# Patient Record
Sex: FEMALE | Race: OTHER | Employment: UNEMPLOYED | URBAN - METROPOLITAN AREA
[De-identification: names, ages, dates, MRNs, and addresses within clinical notes are randomized per-mention and may not be internally consistent; named-entity substitution may affect disease eponyms.]

---

## 2021-11-27 ENCOUNTER — HOSPITAL ENCOUNTER (EMERGENCY)
Age: 26
Discharge: HOME | End: 2021-11-27
Attending: EMERGENCY MEDICINE

## 2021-11-27 VITALS
HEIGHT: 59 IN | TEMPERATURE: 97.7 F | SYSTOLIC BLOOD PRESSURE: 130 MMHG | WEIGHT: 91.71 LBS | DIASTOLIC BLOOD PRESSURE: 64 MMHG | OXYGEN SATURATION: 98 % | HEART RATE: 94 BPM | RESPIRATION RATE: 18 BRPM | BODY MASS INDEX: 18.49 KG/M2

## 2021-11-27 DIAGNOSIS — K80.20 CALCULUS OF GALLBLADDER WITHOUT CHOLECYSTITIS WITHOUT OBSTRUCTION: ICD-10-CM

## 2021-11-27 DIAGNOSIS — K21.9 GASTROESOPHAGEAL REFLUX DISEASE, UNSPECIFIED WHETHER ESOPHAGITIS PRESENT: Primary | ICD-10-CM

## 2021-11-27 PROCEDURE — 74011000250 HC RX REV CODE- 250: Performed by: EMERGENCY MEDICINE

## 2021-11-27 PROCEDURE — 99283 EMERGENCY DEPT VISIT LOW MDM: CPT

## 2021-11-27 PROCEDURE — 74011250637 HC RX REV CODE- 250/637: Performed by: EMERGENCY MEDICINE

## 2021-11-27 RX ORDER — PANTOPRAZOLE SODIUM 40 MG/1
40 TABLET, DELAYED RELEASE ORAL DAILY
Qty: 20 TABLET | Refills: 0 | Status: SHIPPED | OUTPATIENT
Start: 2021-11-27 | End: 2021-12-17

## 2021-11-27 RX ADMIN — ALUMINUM HYDROXIDE, MAGNESIUM HYDROXIDE, AND SIMETHICONE 40 ML: 200; 200; 20 SUSPENSION ORAL at 18:13

## 2021-11-27 NOTE — ED TRIAGE NOTES
Patient arrives with c/o of abdominal pain, and bloating. States pain is worse after eating. States pain in epigastric area, and described as burning sensation. Reports intermittent nausea. States sx present x 1 year.

## 2021-11-28 NOTE — DISCHARGE INSTRUCTIONS
A gallstone was seen on your bedside ultrasound today. Please follow up with general surgery for further work up. Take protonix for pain for your heartburn. Thank you.

## 2021-11-29 NOTE — ED PROVIDER NOTES
Pt is a 26 yo previously healthy female with epigastric pain after meal time. Notes almost daily episodes, no current pain. No N/V. No urinary or vaginal symptoms. Denies CP/SOB/palpitations. No past medical history on file. No past surgical history on file. No family history on file. Social History     Socioeconomic History    Marital status: SINGLE     Spouse name: Not on file    Number of children: Not on file    Years of education: Not on file    Highest education level: Not on file   Occupational History    Not on file   Tobacco Use    Smoking status: Not on file    Smokeless tobacco: Not on file   Substance and Sexual Activity    Alcohol use: Not on file    Drug use: Not on file    Sexual activity: Not on file   Other Topics Concern    Not on file   Social History Narrative    Not on file     Social Determinants of Health     Financial Resource Strain:     Difficulty of Paying Living Expenses: Not on file   Food Insecurity:     Worried About Running Out of Food in the Last Year: Not on file    Clark of Food in the Last Year: Not on file   Transportation Needs:     Lack of Transportation (Medical): Not on file    Lack of Transportation (Non-Medical):  Not on file   Physical Activity:     Days of Exercise per Week: Not on file    Minutes of Exercise per Session: Not on file   Stress:     Feeling of Stress : Not on file   Social Connections:     Frequency of Communication with Friends and Family: Not on file    Frequency of Social Gatherings with Friends and Family: Not on file    Attends Yarsanism Services: Not on file    Active Member of Clubs or Organizations: Not on file    Attends Club or Organization Meetings: Not on file    Marital Status: Not on file   Intimate Partner Violence:     Fear of Current or Ex-Partner: Not on file    Emotionally Abused: Not on file    Physically Abused: Not on file    Sexually Abused: Not on file   Housing Stability:     Unable to Pay for Housing in the Last Year: Not on file    Number of Places Lived in the Last Year: Not on file    Unstable Housing in the Last Year: Not on file         ALLERGIES: Patient has no known allergies. Review of Systems   Constitutional: Negative for chills and fever. HENT: Negative for drooling and nosebleeds. Eyes: Negative for pain and itching. Respiratory: Negative for choking and stridor. Cardiovascular: Negative for leg swelling. Gastrointestinal: Positive for abdominal pain. Negative for abdominal distention and rectal pain. Endocrine: Negative for heat intolerance and polyphagia. Genitourinary: Negative for enuresis and genital sores. Musculoskeletal: Negative for arthralgias and joint swelling. Skin: Negative for color change. Allergic/Immunologic: Negative for immunocompromised state. Neurological: Negative for tremors and speech difficulty. Hematological: Negative for adenopathy. Psychiatric/Behavioral: Negative for dysphoric mood and sleep disturbance. Vitals:    11/27/21 1632 11/27/21 1954 11/27/21 1957   BP: (!) 146/81  130/64   Pulse: 100  94   Resp: 18  18   Temp: 97.7 °F (36.5 °C)     SpO2: 99% 98%    Weight: 41.6 kg (91 lb 11.4 oz)     Height: 4' 11\" (1.499 m)              Physical Exam  Vitals and nursing note reviewed. Constitutional:       General: She is not in acute distress. Appearance: She is well-developed. She is not ill-appearing, toxic-appearing or diaphoretic. HENT:      Head: Normocephalic and atraumatic. Nose: Nose normal.   Eyes:      Conjunctiva/sclera: Conjunctivae normal.   Cardiovascular:      Rate and Rhythm: Normal rate and regular rhythm. Heart sounds: Normal heart sounds. Pulmonary:      Effort: Pulmonary effort is normal. No respiratory distress. Abdominal:      General: There is no distension. Palpations: Abdomen is soft. Tenderness: There is no abdominal tenderness.    Musculoskeletal: General: No deformity. Normal range of motion. Cervical back: Normal range of motion and neck supple. Skin:     General: Skin is warm and dry. Neurological:      Mental Status: She is alert and oriented to person, place, and time. Coordination: Coordination normal.   Psychiatric:         Behavior: Behavior normal.          [facility]  ED Course as of 11/29/21 0046   Sat Nov 27, 2021 2026 Pt with no pain in RUQ during bedside ultrasound when small stone visualized. No US evidence for cholecystitis and negative [place]. Will take protonix for symptoms and see general surgery as outpatient for further management. [AL]      ED Course User Index  [AL] Jackeline Bryson MD         Procedures  Patient's results have been reviewed with them. Patient and/or family have verbally conveyed their understanding and agreement of the patient's signs, symptoms, diagnosis, treatment and prognosis and additionally agree to follow up as recommended or return to the Emergency Room should their condition change prior to follow-up. Discharge instructions have also been provided to the patient with some educational information regarding their diagnosis as well a list of reasons why they would want to return to the ER prior to their follow-up appointment should their condition change.

## 2022-07-15 ENCOUNTER — OFFICE VISIT (OUTPATIENT)
Dept: FAMILY MEDICINE CLINIC | Age: 27
End: 2022-07-15

## 2022-07-15 ENCOUNTER — HOSPITAL ENCOUNTER (OUTPATIENT)
Dept: LAB | Age: 27
Discharge: HOME | End: 2022-07-15

## 2022-07-15 VITALS
BODY MASS INDEX: 18.64 KG/M2 | TEMPERATURE: 98 F | SYSTOLIC BLOOD PRESSURE: 136 MMHG | WEIGHT: 86.4 LBS | OXYGEN SATURATION: 99 % | HEIGHT: 57 IN | HEART RATE: 117 BPM | DIASTOLIC BLOOD PRESSURE: 80 MMHG

## 2022-07-15 DIAGNOSIS — E04.9 ENLARGED THYROID: ICD-10-CM

## 2022-07-15 DIAGNOSIS — R63.4 WEIGHT LOSS: ICD-10-CM

## 2022-07-15 DIAGNOSIS — E04.9 ENLARGED THYROID: Primary | ICD-10-CM

## 2022-07-15 DIAGNOSIS — R10.13 EPIGASTRIC PAIN: ICD-10-CM

## 2022-07-15 PROCEDURE — 80053 COMPREHEN METABOLIC PANEL: CPT

## 2022-07-15 PROCEDURE — 83520 IMMUNOASSAY QUANT NOS NONAB: CPT

## 2022-07-15 PROCEDURE — 85652 RBC SED RATE AUTOMATED: CPT

## 2022-07-15 PROCEDURE — 84443 ASSAY THYROID STIM HORMONE: CPT

## 2022-07-15 PROCEDURE — 84439 ASSAY OF FREE THYROXINE: CPT

## 2022-07-15 PROCEDURE — 84481 FREE ASSAY (FT-3): CPT

## 2022-07-15 PROCEDURE — 85027 COMPLETE CBC AUTOMATED: CPT

## 2022-07-15 PROCEDURE — 99204 OFFICE O/P NEW MOD 45 MIN: CPT | Performed by: FAMILY MEDICINE

## 2022-07-15 RX ORDER — PANTOPRAZOLE SODIUM 40 MG/1
40 TABLET, DELAYED RELEASE ORAL DAILY
Qty: 30 TABLET | Refills: 1 | Status: SHIPPED | OUTPATIENT
Start: 2022-07-15 | End: 2022-10-06 | Stop reason: ALTCHOICE

## 2022-07-15 NOTE — PROGRESS NOTES
Omkar Jolly (: 1995) is a 24 y.o. female, new patient, here for evaluation of the following chief complaint(s):  Epigastric Pain (patient c/o pain after eating w/ burning, nausea and vomiting at times) and Eye Problem (bulging eyes and neck)       ASSESSMENT/PLAN:  1. Enlarged thyroid  Constellation of sx suggest Hyperthyroid/Graves. Check labs and US, recheck in 1 week. -     TSH 3RD GENERATION; Future  -     T4, FREE; Future  -     T3, FREE; Future  -     TSH RECEPTOR AB; Future  -     US THYROID/PARATHYROID/SOFT TISS; Future  2. Weight loss  -     METABOLIC PANEL, COMPREHENSIVE; Future  -     US THYROID/PARATHYROID/SOFT TISS; Future  3. Epigastric pain  Restart Protonix. -     CBC W/O DIFF; Future  -     SED RATE (ESR); Future      Follow-up and Dispositions    · Return for follow up for F2F in 1 week for thyroid and labs review. SUBJECTIVE:  HPI  From [place]. Patient presents with sister. Epigastric Pain:  Epigastric burning, N/V at times x 2 years. Seen in ED 2021 and given Protonix which helped sx but they have recurred since stopping medication. Bulging eyes:  Neck swelling for some time. Unsure. Has lost weight over the past year. Had diarrhea since last year, usually after eating. Feels shaky at times. No menses for the past 5 months. PMHx:  Hx of fall 4 years ago from height of 6 ft in [place] causing her to lose consciousness. Review of Systems   Constitutional: Negative for chills, diaphoresis and fever. Eyes: Negative for photophobia and visual disturbance. Respiratory: Negative for cough. Cardiovascular: Positive for palpitations. Negative for chest pain. Gastrointestinal: Positive for abdominal pain and diarrhea. OBJECTIVE:  Blood pressure 136/80, pulse (!) 117, temperature 98 °F (36.7 °C), temperature source Temporal, height 4' 9.28\" (1.455 m), weight 86 lb 6.4 oz (39.2 kg), SpO2 99 %. Physical Exam  CONSTITUTIONAL:  Thin.   No apparent distress. PSYCHIATRIC: Oriented to time, place, person & situation. Appropriate mood and affect. EYE:  Mild exophthalmos. Lids without swelling. NECK:  Non tender thyromegaly  CARDIOVASCULAR:  Tachycardic, regular. 2/6 systolic murmur on LSB  RESPIRATORY:  Normal effort. Normal ascultation without wheezing. ABDOMEN:  Normal bowel sounds. Abdomen soft, non tender. No hepatosplenomegaly or masses. EXTREMITIES:  No edema. No results found for this visit on 07/15/22. An electronic signature was used to authenticate this note.   -- Hyacinth Muhammad MD

## 2022-07-15 NOTE — PROGRESS NOTES
Jessica George seen at d/c, full name and  verified, given After visit Summary and reviewed today's visit with patient along with instructions on when it is recommended to come back. I reviewed the medication list with the patient to ensure she knows how to and when to take her medications. Side effects, mechanisms of action and medication compliance were reiterated to ensure proper understanding. Good Rx coupons given/texted to patient as well as instructions on how to use at the pharmacy. Patient was assisted in scheduling her imaging appointment for her Ultrasound: THYROID/PARATHYROID ordered today. I have reviewed the provider's instructions with the patient, answering all questions to her satisfaction. Patient verbalized understanding.   Daniel Nolen RN

## 2022-07-16 LAB
ALBUMIN SERPL-MCNC: 3.4 G/DL (ref 3.5–5)
ALBUMIN/GLOB SERPL: 0.9 {RATIO} (ref 1.1–2.2)
ALP SERPL-CCNC: 261 U/L (ref 45–117)
ALT SERPL-CCNC: 51 U/L (ref 12–78)
ANION GAP SERPL CALC-SCNC: 7 MMOL/L (ref 5–15)
AST SERPL-CCNC: 30 U/L (ref 15–37)
BILIRUB SERPL-MCNC: 0.4 MG/DL (ref 0.2–1)
BUN SERPL-MCNC: 11 MG/DL (ref 6–20)
BUN/CREAT SERPL: 28 (ref 12–20)
CALCIUM SERPL-MCNC: 9.8 MG/DL (ref 8.5–10.1)
CHLORIDE SERPL-SCNC: 107 MMOL/L (ref 97–108)
CO2 SERPL-SCNC: 25 MMOL/L (ref 21–32)
CREAT SERPL-MCNC: 0.39 MG/DL (ref 0.55–1.02)
ERYTHROCYTE [DISTWIDTH] IN BLOOD BY AUTOMATED COUNT: 13.6 % (ref 11.5–14.5)
ERYTHROCYTE [SEDIMENTATION RATE] IN BLOOD: 9 MM/HR (ref 0–20)
GLOBULIN SER CALC-MCNC: 3.6 G/DL (ref 2–4)
GLUCOSE SERPL-MCNC: 100 MG/DL (ref 65–100)
HCT VFR BLD AUTO: 32.9 % (ref 35–47)
HGB BLD-MCNC: 10.6 G/DL (ref 11.5–16)
MCH RBC QN AUTO: 25.9 PG (ref 26–34)
MCHC RBC AUTO-ENTMCNC: 32.2 G/DL (ref 30–36.5)
MCV RBC AUTO: 80.2 FL (ref 80–99)
NRBC # BLD: 0 K/UL (ref 0–0.01)
NRBC BLD-RTO: 0 PER 100 WBC
PLATELET # BLD AUTO: 248 K/UL (ref 150–400)
PMV BLD AUTO: 12 FL (ref 8.9–12.9)
POTASSIUM SERPL-SCNC: 3.3 MMOL/L (ref 3.5–5.1)
PROT SERPL-MCNC: 7 G/DL (ref 6.4–8.2)
RBC # BLD AUTO: 4.1 M/UL (ref 3.8–5.2)
SODIUM SERPL-SCNC: 139 MMOL/L (ref 136–145)
T3FREE SERPL-MCNC: >30 PG/ML (ref 2.2–4)
T4 FREE SERPL-MCNC: >8 NG/DL (ref 0.8–1.5)
TSH SERPL DL<=0.05 MIU/L-ACNC: <0.01 UIU/ML (ref 0.36–3.74)
WBC # BLD AUTO: 6.1 K/UL (ref 3.6–11)

## 2022-07-18 LAB — TSH RECEP AB SER-ACNC: 114 IU/L (ref 0–1.75)

## 2022-07-19 ENCOUNTER — APPOINTMENT (OUTPATIENT)
Dept: CT IMAGING | Age: 27
End: 2022-07-19
Attending: EMERGENCY MEDICINE

## 2022-07-19 ENCOUNTER — APPOINTMENT (OUTPATIENT)
Dept: ULTRASOUND IMAGING | Age: 27
End: 2022-07-19
Attending: EMERGENCY MEDICINE

## 2022-07-19 ENCOUNTER — HOSPITAL ENCOUNTER (EMERGENCY)
Age: 27
Discharge: HOME | End: 2022-07-19
Attending: EMERGENCY MEDICINE

## 2022-07-19 VITALS
TEMPERATURE: 97.8 F | BODY MASS INDEX: 18.43 KG/M2 | RESPIRATION RATE: 16 BRPM | SYSTOLIC BLOOD PRESSURE: 122 MMHG | OXYGEN SATURATION: 99 % | HEART RATE: 87 BPM | WEIGHT: 86 LBS | DIASTOLIC BLOOD PRESSURE: 72 MMHG

## 2022-07-19 DIAGNOSIS — R10.84 ABDOMINAL PAIN, GENERALIZED: Primary | ICD-10-CM

## 2022-07-19 DIAGNOSIS — K52.9 ENTERITIS: ICD-10-CM

## 2022-07-19 LAB
ALBUMIN SERPL-MCNC: 3.3 G/DL (ref 3.5–5)
ALBUMIN/GLOB SERPL: 0.8 {RATIO} (ref 1.1–2.2)
ALP SERPL-CCNC: 272 U/L (ref 45–117)
ALT SERPL-CCNC: 57 U/L (ref 12–78)
ANION GAP SERPL CALC-SCNC: 6 MMOL/L (ref 5–15)
APPEARANCE UR: CLEAR
AST SERPL-CCNC: 55 U/L (ref 15–37)
BACTERIA URNS QL MICRO: NEGATIVE /HPF
BASOPHILS # BLD: 0 K/UL (ref 0–0.1)
BASOPHILS NFR BLD: 1 % (ref 0–1)
BILIRUB SERPL-MCNC: 0.4 MG/DL (ref 0.2–1)
BILIRUB UR QL: NEGATIVE
BUN SERPL-MCNC: 16 MG/DL (ref 6–20)
BUN/CREAT SERPL: 37 (ref 12–20)
CALCIUM SERPL-MCNC: 9.7 MG/DL (ref 8.5–10.1)
CHLORIDE SERPL-SCNC: 104 MMOL/L (ref 97–108)
CO2 SERPL-SCNC: 25 MMOL/L (ref 21–32)
COLOR UR: ABNORMAL
CREAT SERPL-MCNC: 0.43 MG/DL (ref 0.55–1.02)
DIFFERENTIAL METHOD BLD: ABNORMAL
EOSINOPHIL # BLD: 0 K/UL (ref 0–0.4)
EOSINOPHIL NFR BLD: 0 % (ref 0–7)
EPITH CASTS URNS QL MICRO: ABNORMAL /LPF
ERYTHROCYTE [DISTWIDTH] IN BLOOD BY AUTOMATED COUNT: 13.6 % (ref 11.5–14.5)
GLOBULIN SER CALC-MCNC: 4.1 G/DL (ref 2–4)
GLUCOSE SERPL-MCNC: 149 MG/DL (ref 65–100)
GLUCOSE UR STRIP.AUTO-MCNC: NEGATIVE MG/DL
HCG UR QL: NEGATIVE
HCG UR QL: NEGATIVE
HCT VFR BLD AUTO: 33.7 % (ref 35–47)
HGB BLD-MCNC: 11 G/DL (ref 11.5–16)
HGB UR QL STRIP: NEGATIVE
HYALINE CASTS URNS QL MICRO: ABNORMAL /LPF (ref 0–2)
IMM GRANULOCYTES # BLD AUTO: 0 K/UL (ref 0–0.04)
IMM GRANULOCYTES NFR BLD AUTO: 0 % (ref 0–0.5)
KETONES UR QL STRIP.AUTO: NEGATIVE MG/DL
LACTATE BLD-SCNC: 1.51 MMOL/L (ref 0.4–2)
LEUKOCYTE ESTERASE UR QL STRIP.AUTO: NEGATIVE
LIPASE SERPL-CCNC: 125 U/L (ref 73–393)
LYMPHOCYTES # BLD: 2.6 K/UL (ref 0.8–3.5)
LYMPHOCYTES NFR BLD: 41 % (ref 12–49)
MAGNESIUM SERPL-MCNC: 2.1 MG/DL (ref 1.6–2.4)
MCH RBC QN AUTO: 25.6 PG (ref 26–34)
MCHC RBC AUTO-ENTMCNC: 32.6 G/DL (ref 30–36.5)
MCV RBC AUTO: 78.4 FL (ref 80–99)
MONOCYTES # BLD: 0.7 K/UL (ref 0–1)
MONOCYTES NFR BLD: 11 % (ref 5–13)
NEUTS SEG # BLD: 3.1 K/UL (ref 1.8–8)
NEUTS SEG NFR BLD: 47 % (ref 32–75)
NITRITE UR QL STRIP.AUTO: NEGATIVE
NRBC # BLD: 0 K/UL (ref 0–0.01)
NRBC BLD-RTO: 0 PER 100 WBC
PH UR STRIP: 7 [PH] (ref 5–8)
PLATELET # BLD AUTO: 261 K/UL (ref 150–400)
PMV BLD AUTO: 10.8 FL (ref 8.9–12.9)
POTASSIUM SERPL-SCNC: 4.3 MMOL/L (ref 3.5–5.1)
PROT SERPL-MCNC: 7.4 G/DL (ref 6.4–8.2)
PROT UR STRIP-MCNC: 30 MG/DL
RBC # BLD AUTO: 4.3 M/UL (ref 3.8–5.2)
RBC #/AREA URNS HPF: ABNORMAL /HPF (ref 0–5)
SODIUM SERPL-SCNC: 135 MMOL/L (ref 136–145)
SP GR UR REFRACTOMETRY: 1.02 (ref 1–1.03)
UROBILINOGEN UR QL STRIP.AUTO: 0.2 EU/DL (ref 0.2–1)
WBC # BLD AUTO: 6.5 K/UL (ref 3.6–11)
WBC URNS QL MICRO: ABNORMAL /HPF (ref 0–4)

## 2022-07-19 PROCEDURE — 96374 THER/PROPH/DIAG INJ IV PUSH: CPT

## 2022-07-19 PROCEDURE — 81025 URINE PREGNANCY TEST: CPT

## 2022-07-19 PROCEDURE — 74011000250 HC RX REV CODE- 250: Performed by: EMERGENCY MEDICINE

## 2022-07-19 PROCEDURE — 74011000636 HC RX REV CODE- 636: Performed by: EMERGENCY MEDICINE

## 2022-07-19 PROCEDURE — 74177 CT ABD & PELVIS W/CONTRAST: CPT

## 2022-07-19 PROCEDURE — 85025 COMPLETE CBC W/AUTO DIFF WBC: CPT

## 2022-07-19 PROCEDURE — 83690 ASSAY OF LIPASE: CPT

## 2022-07-19 PROCEDURE — 99285 EMERGENCY DEPT VISIT HI MDM: CPT

## 2022-07-19 PROCEDURE — 74011250637 HC RX REV CODE- 250/637: Performed by: EMERGENCY MEDICINE

## 2022-07-19 PROCEDURE — 36415 COLL VENOUS BLD VENIPUNCTURE: CPT

## 2022-07-19 PROCEDURE — 83735 ASSAY OF MAGNESIUM: CPT

## 2022-07-19 PROCEDURE — 83605 ASSAY OF LACTIC ACID: CPT

## 2022-07-19 PROCEDURE — 76705 ECHO EXAM OF ABDOMEN: CPT

## 2022-07-19 PROCEDURE — 74011250636 HC RX REV CODE- 250/636: Performed by: EMERGENCY MEDICINE

## 2022-07-19 PROCEDURE — 80053 COMPREHEN METABOLIC PANEL: CPT

## 2022-07-19 PROCEDURE — 81001 URINALYSIS AUTO W/SCOPE: CPT

## 2022-07-19 RX ORDER — FAMOTIDINE 20 MG/1
20 TABLET, FILM COATED ORAL 2 TIMES DAILY
Qty: 20 TABLET | Refills: 0 | Status: SHIPPED | OUTPATIENT
Start: 2022-07-19 | End: 2022-07-29

## 2022-07-19 RX ORDER — ONDANSETRON 2 MG/ML
4 INJECTION INTRAMUSCULAR; INTRAVENOUS
Status: COMPLETED | OUTPATIENT
Start: 2022-07-19 | End: 2022-07-19

## 2022-07-19 RX ORDER — DICYCLOMINE HYDROCHLORIDE 10 MG/1
10 CAPSULE ORAL
Qty: 30 CAPSULE | Refills: 0 | Status: SHIPPED | OUTPATIENT
Start: 2022-07-19 | End: 2022-10-06 | Stop reason: ALTCHOICE

## 2022-07-19 RX ORDER — DOCUSATE SODIUM 100 MG/1
100 CAPSULE, LIQUID FILLED ORAL 2 TIMES DAILY
Qty: 60 CAPSULE | Refills: 2 | Status: SHIPPED | OUTPATIENT
Start: 2022-07-19 | End: 2022-10-06 | Stop reason: ALTCHOICE

## 2022-07-19 RX ORDER — ONDANSETRON 4 MG/1
4 TABLET, FILM COATED ORAL
Qty: 20 TABLET | Refills: 0 | Status: SHIPPED | OUTPATIENT
Start: 2022-07-19 | End: 2022-10-06 | Stop reason: ALTCHOICE

## 2022-07-19 RX ADMIN — ALUMINUM HYDROXIDE, MAGNESIUM HYDROXIDE, AND SIMETHICONE 40 ML: 200; 200; 20 SUSPENSION ORAL at 10:22

## 2022-07-19 RX ADMIN — IOPAMIDOL 100 ML: 755 INJECTION, SOLUTION INTRAVENOUS at 12:05

## 2022-07-19 RX ADMIN — SODIUM CHLORIDE 1000 ML: 9 INJECTION, SOLUTION INTRAVENOUS at 10:21

## 2022-07-19 RX ADMIN — ONDANSETRON 4 MG: 2 INJECTION INTRAMUSCULAR; INTRAVENOUS at 10:21

## 2022-07-19 NOTE — Clinical Note
[facility]  [facility] EMERGENCY DEPT  [address] [id number]  [phone]    Work/School Note    Date: 7/19/2022    To Whom It May concern:    Stacey Carrillo was seen and treated today in the emergency room by the following provider(s):  Attending Provider: Shante Poole MD.      Stacey Carrillo is excused from work/school on 7/19/2022 through 7/21/2022. She is medically clear to return to work/school on 7/22/2022.          Sincerely,          Barbi Wright MD

## 2022-07-19 NOTE — ED PROVIDER NOTES
Patient is a 57-year-old female brought to the emergency department via private vehicle by family who presents with abdominal pain, nausea vomiting and occasional diarrhea. sHe states that she is not been diagnosed with ulcers before but she has been told that she had a small stone in her gallbladder. Notes significant pain with eating anything. Also notes significant weight loss. They states she used to weigh well over 100 pounds and now she is only 86 pounds. They deny any hematemesis, hematochezia or melena. No past medical history on file. No past surgical history on file. No family history on file. Social History     Socioeconomic History    Marital status: SINGLE     Spouse name: Not on file    Number of children: Not on file    Years of education: Not on file    Highest education level: Not on file   Occupational History    Not on file   Tobacco Use    Smoking status: Never Smoker    Smokeless tobacco: Never Used   Substance and Sexual Activity    Alcohol use: Not on file    Drug use: Not on file    Sexual activity: Not on file   Other Topics Concern    Not on file   Social History Narrative    Not on file     Social Determinants of Health     Financial Resource Strain:     Difficulty of Paying Living Expenses: Not on file   Food Insecurity:     Worried About [address] in the Last Year: Not on file    Ran Out of Food in the Last Year: Not on file   Transportation Needs:     Lack of Transportation (Medical): Not on file    Lack of Transportation (Non-Medical):  Not on file   Physical Activity:     Days of Exercise per Week: Not on file    Minutes of Exercise per Session: Not on file   Stress:     Feeling of Stress : Not on file   Social Connections:     Frequency of Communication with Friends and Family: Not on file    Frequency of Social Gatherings with Friends and Family: Not on file    Attends Restorationist Services: Not on file    Active Member of Clubs or Organizations: Not on file    Attends Club or Organization Meetings: Not on file    Marital Status: Not on file   Intimate Partner Violence:     Fear of Current or Ex-Partner: Not on file    Emotionally Abused: Not on file    Physically Abused: Not on file    Sexually Abused: Not on file   Housing Stability:     Unable to Pay for Housing in the Last Year: Not on file    Number of Deejay in the Last Year: Not on file    Unstable Housing in the Last Year: Not on file         ALLERGIES: Patient has no known allergies. Review of Systems   Constitutional: Positive for appetite change, chills, fatigue and unexpected weight change. Negative for fever. HENT: Negative for drooling. Respiratory: Negative for shortness of breath. Cardiovascular: Negative for chest pain. Gastrointestinal: Positive for abdominal pain, nausea and vomiting. Negative for blood in stool. Genitourinary: Negative for dysuria. Musculoskeletal: Negative for neck pain. Neurological: Negative for seizures and syncope. Hematological: Does not bruise/bleed easily. Psychiatric/Behavioral: Negative. Vitals:    07/19/22 0943   BP: 127/74   Pulse: 95   Resp: 16   Temp: 97.8 °F (36.6 °C)   SpO2: 98%   Weight: 39 kg (86 lb)            Physical Exam  Vitals and nursing note reviewed. Constitutional:       Appearance: She is ill-appearing. She is not toxic-appearing or diaphoretic. Comments: Cachectic   HENT:      Head: Normocephalic and atraumatic. Eyes:      General: No scleral icterus. Cardiovascular:      Rate and Rhythm: Normal rate. Heart sounds: No friction rub. Pulmonary:      Effort: Pulmonary effort is normal.      Breath sounds: Normal breath sounds. Abdominal:      General: Abdomen is flat. Palpations: Abdomen is soft. Tenderness: There is abdominal tenderness in the right lower quadrant, epigastric area, periumbilical area and left lower quadrant. Negative signs include Yañez's sign and Rovsing's sign. Skin:     General: Skin is warm and dry. Neurological:      Mental Status: She is alert and oriented to person, place, and time. Psychiatric:         Mood and Affect: Mood normal.         Behavior: Behavior normal.          MDM  Number of Diagnoses or Management Options  Abdominal pain, generalized  Enteritis  Diagnosis management comments: Findings discussed at length with patient using translation service. Patient advised close follow-up with specialist and given strict return precautions. Do suspect some component of gastritis and possibly an autoimmune disorder due to chronicity of symptoms however patient tolerating p.o. in the ED and feeling better with medicines given.        Amount and/or Complexity of Data Reviewed  Clinical lab tests: reviewed and ordered  Tests in the radiology section of CPT®: reviewed and ordered           Procedures

## 2022-07-19 NOTE — ED TRIAGE NOTES
Pt to ED for c/o upper abd pain worsening with eating since yesterday.  Reports nausea, denies emesis, diarrhea, fever, chills, dysuria

## 2022-07-19 NOTE — ED NOTES
Patient was discharged by provider,    Patient understood discharge instructions and had no further questions.

## 2022-07-22 ENCOUNTER — OFFICE VISIT (OUTPATIENT)
Dept: FAMILY MEDICINE CLINIC | Age: 27
End: 2022-07-22

## 2022-07-22 ENCOUNTER — HOSPITAL ENCOUNTER (OUTPATIENT)
Dept: ULTRASOUND IMAGING | Age: 27
Discharge: HOME | End: 2022-07-22
Attending: FAMILY MEDICINE

## 2022-07-22 VITALS
HEIGHT: 57 IN | DIASTOLIC BLOOD PRESSURE: 62 MMHG | HEART RATE: 97 BPM | OXYGEN SATURATION: 98 % | BODY MASS INDEX: 17.69 KG/M2 | SYSTOLIC BLOOD PRESSURE: 113 MMHG | TEMPERATURE: 98.1 F | WEIGHT: 82 LBS

## 2022-07-22 DIAGNOSIS — E05.00 GRAVES' DISEASE: Primary | ICD-10-CM

## 2022-07-22 DIAGNOSIS — E04.9 ENLARGED THYROID: ICD-10-CM

## 2022-07-22 DIAGNOSIS — R63.4 WEIGHT LOSS: ICD-10-CM

## 2022-07-22 PROCEDURE — 76536 US EXAM OF HEAD AND NECK: CPT

## 2022-07-22 PROCEDURE — 99213 OFFICE O/P EST LOW 20 MIN: CPT | Performed by: FAMILY MEDICINE

## 2022-07-22 RX ORDER — METHIMAZOLE 10 MG/1
20 TABLET ORAL 2 TIMES DAILY
Qty: 120 TABLET | Refills: 1 | Status: SHIPPED | OUTPATIENT
Start: 2022-07-22 | End: 2022-10-06 | Stop reason: SDUPTHER

## 2022-07-22 RX ORDER — METHIMAZOLE 10 MG/1
20 TABLET ORAL DAILY
Qty: 60 TABLET | Refills: 1 | Status: SHIPPED | OUTPATIENT
Start: 2022-07-22 | End: 2022-07-22 | Stop reason: SDUPTHER

## 2022-07-22 NOTE — Clinical Note
I am referring this patient to you for hyperthyroidism, Graves. I started her on Methimazole 20mg. Thyroid US is pending. Appreciate any input until you can see her.

## 2022-07-22 NOTE — PROGRESS NOTES
Omkar Jolly (: 1995) is a 24 y.o. female, established patient, here for evaluation of the following chief complaint(s):  Hospital Follow Up, Thyroid Problem (F/up and lab results from ER), and Other (Pt is requesting to see the [facility] for Hospital bills)       ASSESSMENT/PLAN:  1. Graves' disease  Will start her on Methimazole 20mg BID and refer to Dr Fiona Hernandez. Follow-up and Dispositions    Return for follow up for F2F in 2 weeks for hyperthyroidism. SUBJECTIVE:  Hyperthyroid:  Neck swelling for some time, weight loss over the past year, diarrhea since last year, usually after eating, epigastric burning w N/V at times. Feels shaky at times. No menses for the past 5 months. Recent labs reviewed. Review of Systems   Eyes:  Negative for photophobia, pain and visual disturbance. Denies double vision. OBJECTIVE:  Blood pressure 113/62, pulse 97, temperature 98.1 °F (36.7 °C), temperature source Temporal, height 4' 9.28\" (1.455 m), weight 82 lb (37.2 kg), SpO2 98 %. Physical Exam  CONSTITUTIONAL:  Thin. No apparent distress. PSYCHIATRIC: Oriented to time, place, person & situation. Appropriate mood and affect. EYE:  Mild exophthalmos. Lids without swelling. NECK:  Non tender thyromegaly  CARDIOVASCULAR:  Tachycardic, regular. 1/6 systolic murmur on LSB  RESPIRATORY:  Normal effort. Normal ascultation without wheezing. EXTREMITIES:  No edema. No results found for this visit on 22. An electronic signature was used to authenticate this note.   -- Cathy Hung MD

## 2022-07-22 NOTE — PROGRESS NOTES
Coordination of Care  1. Have you been to the ER, urgent care clinic since your last visit? Hospitalized since your last visit? Yes When: 7/19/2022-SF-Abdominal pain    2. Have you seen or consulted any other health care providers outside of the [address] [place] [address] since your last visit? Include any pap smears or colon screening. No    Does the patient need refills? NO    Learning Assessment Complete?  yes  Depression Screening complete in the past 12 months? yes

## 2022-07-22 NOTE — PROGRESS NOTES
Spoke with patient and confirmed with patient to take Methimazole 20mg twice a day. She has not picked it up yet and plans to pick it up tomorrow. New prescriptions for higher dose sent.

## 2022-07-22 NOTE — PROGRESS NOTES
Adolfo,    I would touch base with Madison Rodriguez about when this patient can be seen as I think my next available is in November. Based on the labs drawn:    Component      Latest Ref Rng & Units 7/15/2022 7/15/2022 7/15/2022 7/15/2022           1:21 PM  1:21 PM  1:21 PM  1:21 PM   TSH      0.36 - 3.74 uIU/mL    <0.01 (L)   T4, Free      0.8 - 1.5 NG/DL   >8.0 (H)    Free Triiodothyronine (T3)      2.2 - 4.0 pg/mL  >30.0 (H)     Thyrotropin Receptor Ab, serum      0.00 - 1.75 IU/L 114.00 (H)        She has Grave's that is quite severe with a very high TSH receptor ab and her free T4 and free T3 are both unable to be measured they are so high. As a result, I would start her on methimazole 20 mg TWICE daily instead of once daily and then repeat her labs in 6 weeks and forward me the results and I can help you adjust her dose until she is able to get in with me.     Thanks,  [employer]

## 2022-07-22 NOTE — PROGRESS NOTES
Verified name and . An AVS was printed and given to the patient. Reviewed all discharge instructions, including: new medications, possible side effects, goodRX coupons, and f/up appt. Informed pt she will get a call from another nurse to make an appt w/ Dr. Sumaya Farmer. Allowed time for questions and patient verbalized understanding to all given instructions. Patient discharged from clinic in stable condition.

## 2022-07-28 ENCOUNTER — TELEPHONE (OUTPATIENT)
Dept: FAMILY MEDICINE CLINIC | Age: 27
End: 2022-07-28

## 2022-07-28 NOTE — TELEPHONE ENCOUNTER
Tc to the Pharmacy returning their call. The Pharmacy stated they got 2 Rx's for the Methimazole 10 mg. That stated 2 tablets 1x a day, and then 4 hrs later they got another rx for Methimazole 10 mg 2 tablets 2 times a day. The pt's chart was reviewed. The provider has 1 current Methimazole rx ordered in the chart for Methimazole 10 mg 2 tablets, 2 times a day. The providers progress note stated we will start Methimazole 20 mg, 2 times a day and refer to Dr Stevie Cummins. The Pharmacist was informed the Methamizole 10 mg 2 tablets 2 times a day would be the correct Rx. The Pharmacist stated she would contact the pt.  Govind Miller RN

## 2022-08-06 NOTE — PROGRESS NOTES
Rekha Hobbs is the US for the Graves disease patient we touched on last week. Are the enlarged lymph nodes from the Graves?  And should I get a CT of neck now or wait to see if these improve with Methimazole? Thank you for your input   Vinny Carter   -------------------------------------------------------------------------------------------------------------------  Vinny Carter,    Thanks for your patience as it's been extremely busy getting caught up after being on vacation from 7/23-8/1/22. It's possible these lymph nodes are from [id number]. I recommend holding on a CT of the neck for now and we can consider repeating an ultrasound in 6 months to see if these have changed or not.     Thanks,  Stephani Breen

## 2022-08-12 ENCOUNTER — OFFICE VISIT (OUTPATIENT)
Dept: FAMILY MEDICINE CLINIC | Age: 27
End: 2022-08-12

## 2022-08-12 VITALS
OXYGEN SATURATION: 100 % | BODY MASS INDEX: 21.14 KG/M2 | HEART RATE: 95 BPM | DIASTOLIC BLOOD PRESSURE: 68 MMHG | HEIGHT: 57 IN | WEIGHT: 98 LBS | SYSTOLIC BLOOD PRESSURE: 132 MMHG | TEMPERATURE: 98 F

## 2022-08-12 DIAGNOSIS — R59.0 CERVICAL LYMPHADENOPATHY: ICD-10-CM

## 2022-08-12 DIAGNOSIS — E05.00 GRAVES' DISEASE: Primary | ICD-10-CM

## 2022-08-12 PROCEDURE — 99213 OFFICE O/P EST LOW 20 MIN: CPT | Performed by: FAMILY MEDICINE

## 2022-08-12 NOTE — PROGRESS NOTES
Moraima Page (: 1995) is a 24 y.o. female, established patient, here for evaluation of the following chief complaint(s):  Thyroid Problem (Follow up)       ASSESSMENT/PLAN:  1. Graves' disease  Has only been on medication 2 weeks and has noted some improvement. Will recheck in 4 weeks w labs. 2. Cervical lymphadenopathy  May be reactive. Will consider repeating after Graves disease is controlled. Follow-up and Dispositions    Return for follow up for F2F in 4 weeks for labs. SUBJECTIVE:  Graves Disease:  Dx 7/15/22. Patient is taking Methimazole 20mg BID x 2 weeks. Has noted some weight gain. Her epigastric pain, diarrhea has almost resolved. Presented with 1 year of neck swelling, weight loss, diarrhea, epigastric burning. Feels shaky at times. No menses for the past 5 months. Reviewed recent labs and US with patient. Review of Systems   Constitutional:  Negative for chills, diaphoresis, fatigue and fever. HENT:  Negative for sore throat and trouble swallowing. Respiratory:  Negative for shortness of breath. Cardiovascular:  Negative for palpitations. Gastrointestinal:  Negative for diarrhea. Hematological:  Negative for adenopathy. Does not bruise/bleed easily. OBJECTIVE:  Blood pressure 132/68, pulse 95, temperature 98 °F (36.7 °C), temperature source Temporal, height 4' 9.28\" (1.455 m), weight 98 lb (44.5 kg), SpO2 100 %. Physical Exam  CONSTITUTIONAL:  Thin. No apparent distress. PSYCHIATRIC: Oriented to time, place, person & situation. Appropriate mood and affect. EYE:  Mild exophthalmos. Lids without swelling. NECK:  Non tender thyromegaly. Lt lymphadenopathy noted at angle of jaw, NT.  CARDIOVASCULAR:  Mild tachycardic, regular. 2/6 systolic murmur on LSB  RESPIRATORY:  Normal effort. Normal ascultation without wheezing. EXTREMITIES:  No edema. No results found for this visit on 22.       An electronic signature was used to authenticate this note.   -- Lucho Tierney MD

## 2022-08-12 NOTE — PROGRESS NOTES
Coordination of Care  1. Have you been to the ER, urgent care clinic since your last visit? Hospitalized since your last visit? No    2. Have you seen or consulted any other health care providers outside of the [address] [place] [address] since your last visit? Include any pap smears or colon screening. No    Does the patient need refills? YES    Learning Assessment Complete?  yes  Depression Screening complete in the past 12 months? yes

## 2022-10-06 ENCOUNTER — OFFICE VISIT (OUTPATIENT)
Dept: FAMILY MEDICINE CLINIC | Age: 27
End: 2022-10-06

## 2022-10-06 ENCOUNTER — HOSPITAL ENCOUNTER (OUTPATIENT)
Dept: LAB | Age: 27
Discharge: HOME | End: 2022-10-06

## 2022-10-06 VITALS
TEMPERATURE: 98.1 F | DIASTOLIC BLOOD PRESSURE: 69 MMHG | OXYGEN SATURATION: 99 % | WEIGHT: 104 LBS | SYSTOLIC BLOOD PRESSURE: 122 MMHG | BODY MASS INDEX: 22.44 KG/M2 | HEIGHT: 57 IN | HEART RATE: 76 BPM

## 2022-10-06 DIAGNOSIS — E05.00 GRAVES' DISEASE: Primary | ICD-10-CM

## 2022-10-06 DIAGNOSIS — E05.00 GRAVES' DISEASE: ICD-10-CM

## 2022-10-06 PROCEDURE — 36415 COLL VENOUS BLD VENIPUNCTURE: CPT

## 2022-10-06 PROCEDURE — 84439 ASSAY OF FREE THYROXINE: CPT

## 2022-10-06 PROCEDURE — 99213 OFFICE O/P EST LOW 20 MIN: CPT | Performed by: FAMILY MEDICINE

## 2022-10-06 PROCEDURE — 84443 ASSAY THYROID STIM HORMONE: CPT

## 2022-10-06 PROCEDURE — 80053 COMPREHEN METABOLIC PANEL: CPT

## 2022-10-06 PROCEDURE — 85025 COMPLETE CBC W/AUTO DIFF WBC: CPT

## 2022-10-06 RX ORDER — METHIMAZOLE 10 MG/1
20 TABLET ORAL 2 TIMES DAILY
Qty: 120 TABLET | Refills: 1 | Status: SHIPPED | OUTPATIENT
Start: 2022-10-06 | End: 2022-11-02 | Stop reason: SDUPTHER

## 2022-10-06 NOTE — PROGRESS NOTES
Verified name and . An AVS was printed and given to the patient. Reviewed all discharge instructions, including: continuing medications, goodRX coupons, and f/up appt. Allowed time for questions and patient verbalized understanding to all given instructions. Patient discharged from clinic in stable condition.

## 2022-10-06 NOTE — PROGRESS NOTES
Jackie Urbano (: 1995) is a 24 y.o. female, established patient, here for evaluation of the following chief complaint(s):  Thyroid Problem (Follow up)       ASSESSMENT/PLAN:  1. Graves' disease  -     CBC WITH AUTOMATED DIFF; Future  -     METABOLIC PANEL, COMPREHENSIVE; Future  -     TSH 3RD GENERATION; Future  -     T4, FREE; Future  Improved symptomatically. Recheck labs, continue with current dose, follow up with Dr Monique Teague. Follow-up and Dispositions    Return for follow up as schedule with Dr Monique Teague. SUBJECTIVE:  Graves Disease:  Dx 7/15/22. Patient is taking Methimazole 20mg BID regularly. Has noted some weight gain. Denies nausea, diarrhea, thyroid pain, difficulty swallowing, double vision. Is eating well. Presented with 1 year of neck swelling, weight loss, diarrhea, epigastric burning, amenorrhea, feeling shaky. Review of Systems   Constitutional:  Negative for chills and fever. Cardiovascular:  Negative for chest pain and palpitations. OBJECTIVE:  Blood pressure 122/69, pulse 76, temperature 98.1 °F (36.7 °C), temperature source Temporal, height 4' 9.28\" (1.455 m), weight 104 lb (47.2 kg), last menstrual period 2022, SpO2 99 %. Physical Exam  CONSTITUTIONAL:  Well developed. No apparent distress. PSYCHIATRIC: Oriented to time, place, person & situation. Appropriate mood and affect. EYE:  Mild exophthalmos. Lids without swelling. NECK:  Non tender thyromegaly. NT 1 cm lymph node along Lt lateral cervical chain. CARDIOVASCULAR:  Regular rate and rhythm. 1/6 systolic murmur on LSB  RESPIRATORY:  Normal effort. Normal ascultation without wheezing. No results found for this visit on 10/06/22. An electronic signature was used to authenticate this note.   -- Sha Baumann MD

## 2022-10-06 NOTE — PROGRESS NOTES
Coordination of Care  1. Have you been to the ER, urgent care clinic since your last visit? Hospitalized since your last visit? No    2. Have you seen or consulted any other health care providers outside of the [address] [place] [address] since your last visit? Include any pap smears or colon screening. No    Does the patient need refills? YES    Learning Assessment Complete?  yes  Depression Screening complete in the past 12 months? yes

## 2022-10-07 LAB
ALBUMIN SERPL-MCNC: 3.7 G/DL (ref 3.5–5)
ALBUMIN/GLOB SERPL: 0.9 {RATIO} (ref 1.1–2.2)
ALP SERPL-CCNC: 688 U/L (ref 45–117)
ALT SERPL-CCNC: 45 U/L (ref 12–78)
ANION GAP SERPL CALC-SCNC: 6 MMOL/L (ref 5–15)
AST SERPL-CCNC: 20 U/L (ref 15–37)
BASOPHILS # BLD: 0 K/UL (ref 0–0.1)
BASOPHILS NFR BLD: 1 % (ref 0–1)
BILIRUB SERPL-MCNC: 0.2 MG/DL (ref 0.2–1)
BUN SERPL-MCNC: 13 MG/DL (ref 6–20)
BUN/CREAT SERPL: 18 (ref 12–20)
CALCIUM SERPL-MCNC: 8.9 MG/DL (ref 8.5–10.1)
CHLORIDE SERPL-SCNC: 108 MMOL/L (ref 97–108)
CO2 SERPL-SCNC: 24 MMOL/L (ref 21–32)
CREAT SERPL-MCNC: 0.71 MG/DL (ref 0.55–1.02)
DIFFERENTIAL METHOD BLD: ABNORMAL
EOSINOPHIL # BLD: 0.1 K/UL (ref 0–0.4)
EOSINOPHIL NFR BLD: 2 % (ref 0–7)
ERYTHROCYTE [DISTWIDTH] IN BLOOD BY AUTOMATED COUNT: 14.4 % (ref 11.5–14.5)
GLOBULIN SER CALC-MCNC: 4 G/DL (ref 2–4)
GLUCOSE SERPL-MCNC: 94 MG/DL (ref 65–100)
HCT VFR BLD AUTO: 37.5 % (ref 35–47)
HGB BLD-MCNC: 11.8 G/DL (ref 11.5–16)
IMM GRANULOCYTES # BLD AUTO: 0 K/UL (ref 0–0.04)
IMM GRANULOCYTES NFR BLD AUTO: 0 % (ref 0–0.5)
LYMPHOCYTES # BLD: 1.9 K/UL (ref 0.8–3.5)
LYMPHOCYTES NFR BLD: 36 % (ref 12–49)
MCH RBC QN AUTO: 24.4 PG (ref 26–34)
MCHC RBC AUTO-ENTMCNC: 31.5 G/DL (ref 30–36.5)
MCV RBC AUTO: 77.6 FL (ref 80–99)
MONOCYTES # BLD: 0.5 K/UL (ref 0–1)
MONOCYTES NFR BLD: 9 % (ref 5–13)
NEUTS SEG # BLD: 2.7 K/UL (ref 1.8–8)
NEUTS SEG NFR BLD: 52 % (ref 32–75)
NRBC # BLD: 0 K/UL (ref 0–0.01)
NRBC BLD-RTO: 0 PER 100 WBC
PLATELET # BLD AUTO: 308 K/UL (ref 150–400)
PMV BLD AUTO: 10 FL (ref 8.9–12.9)
POTASSIUM SERPL-SCNC: 4 MMOL/L (ref 3.5–5.1)
PROT SERPL-MCNC: 7.7 G/DL (ref 6.4–8.2)
RBC # BLD AUTO: 4.83 M/UL (ref 3.8–5.2)
SODIUM SERPL-SCNC: 138 MMOL/L (ref 136–145)
T4 FREE SERPL-MCNC: 1.3 NG/DL (ref 0.8–1.5)
TSH SERPL DL<=0.05 MIU/L-ACNC: <0.01 UIU/ML (ref 0.36–3.74)
WBC # BLD AUTO: 5.2 K/UL (ref 3.6–11)

## 2022-11-02 ENCOUNTER — OFFICE VISIT (OUTPATIENT)
Dept: FAMILY MEDICINE CLINIC | Age: 27
End: 2022-11-02

## 2022-11-02 VITALS
SYSTOLIC BLOOD PRESSURE: 113 MMHG | OXYGEN SATURATION: 98 % | BODY MASS INDEX: 22.45 KG/M2 | DIASTOLIC BLOOD PRESSURE: 62 MMHG | WEIGHT: 104.8 LBS

## 2022-11-02 DIAGNOSIS — E05.00 GRAVES' DISEASE: Primary | ICD-10-CM

## 2022-11-02 PROCEDURE — 99214 OFFICE O/P EST MOD 30 MIN: CPT | Performed by: INTERNAL MEDICINE

## 2022-11-02 RX ORDER — METHIMAZOLE 10 MG/1
30 TABLET ORAL DAILY
Qty: 90 TABLET | Refills: 11 | Status: SHIPPED | OUTPATIENT
Start: 2022-11-02

## 2022-11-02 NOTE — PATIENT INSTRUCTIONS
1) Catie methimazole 30 mg = 3 pastillas juntas todos los [place] hasta pastrana flory en [place]. 2) Usualmente con esta condicion, milena persona tiene que frankie pastillas para 2 anos.

## 2022-11-02 NOTE — PROGRESS NOTES
Chief Complaint   Patient presents with    Thyroid Problem     Follow-up with Dr. Darius Orellana     History of Present Illness: Darrell Elizabeth is a 24 y.o. female who is a new patient for hyperthyroidism referred by Dr. Henriquez Scales him in July 2022 and was diagnosed with Grave's disease when her TSH was < 0.01, FT4 > 8.0, FT3 >30 and TRAb of 114 and started on Methimazole 20 mg bid. Had repeat labs on 10/6/22 that showed TSH < 0.01 and FT4 1.3 and dose was kept the same. However can sometime miss the morning dose 2 times per week. Has been compliant with this aside from running out one week ago for about a week but now is back on this. No longer having any chest pain. Does have palpitations on occasion but better than before treatment. Has had improvement in tremors. Was having a lot of epigastric discomfort that has now improved. Was having heat intolerance that is now better. Does still have some hair loss but is better. Didn't have a period for 5 months but has had her period the past 3 months. Her weight decreased from 91 in 11/21 to 82 in 7/22 and is up to 104 today. Did have some headaches while out of the methimazole but not while on this. Does have improvement in energy. Was having some neck pain in 7/22 that has improved. No dysphagia or trouble breathing. Did have some leg swelling that has improved. Has some blurry vision that is better and has had some double vision that is better. May have some dry eyes at times. Past Medical History:   Diagnosis Date    Graves disease 07/2022     History reviewed. No pertinent surgical history. Current Outpatient Medications   Medication Sig    methIMAzole (TAPAZOLE) 10 mg tablet Take 2 Tablets by mouth two (2) times a day. No current facility-administered medications for this visit.      No Known Allergies    Family History   Problem Relation Age of Onset    Thyroid Disease Mother     No Known Problems Father      Social History Socioeconomic History    Marital status: SINGLE     Spouse name: Not on file    Number of children: Not on file    Years of education: Not on file    Highest education level: Not on file   Occupational History    Not on file   Tobacco Use    Smoking status: Never    Smokeless tobacco: Never   Substance and Sexual Activity    Alcohol use: Never    Drug use: Never    Sexual activity: Not on file   Other Topics Concern    Not on file   Social History Narrative    Lives in [place] with her father and sister. Originally from [place] and moved to the [address] [place] [address] in 2019. Works cleaning houses. Social Determinants of Health     Financial Resource Strain: Not on file   Food Insecurity: Not on file   Transportation Needs: Not on file   Physical Activity: Not on file   Stress: Not on file   Social Connections: Not on file   Intimate Partner Violence: Not on file   Housing Stability: Not on file     Review of Systems: PER HPI    Physical Examination:  Blood pressure 113/62, weight 104 lb 12.8 oz (47.5 kg), SpO2 98 %.   General: pleasant, no distress, good eye contact  HEENT: no exopthalmos, mild periorbital edema, no scleral/conjunctival injection, EOMI, no lid lag or stare  Neck: supple, (+) diffuse goiter 2-3x normal with thyroid bruit, no masses, lymph nodes, or carotid bruits, no supraclavicular or dorsocervical fat pads  Cardiovascular: regular, normal rate, normal S1 and S2, no murmurs/rubs/gallops   Respiratory: clear to auscultation bilaterally  Gastrointestinal: soft, nontender, nondistended, no masses, no hepatosplenomegaly  Musculoskeletal: no proximal muscle weakness in upper or lower extremities  Integumentary: no acanthosis nigricans, no rashes, no edema  Neurological: reflexes 2+ at biceps, (+) mild tremor  Psychiatric: normal mood and affect    Data Reviewed:   Component      Latest Ref Rng & Units 10/6/2022 7/19/2022 7/15/2022   WBC      3.6 - 11.0 K/uL  6.5 6.1   RBC      3.80 - 5.20 M/uL  4.30 4.10 HGB      11.5 - 16.0 g/dL  11.0 (L) 10.6 (L)   HCT      35.0 - 47.0 %  33.7 (L) 32.9 (L)   MCV      80.0 - 99.0 FL  78.4 (L) 80.2   MCH      26.0 - 34.0 PG  25.6 (L) 25.9 (L)   MCHC      30.0 - 36.5 g/dL  32.6 32.2   RDW      11.5 - 14.5 %  13.6 13.6   PLATELET      527 - 905 K/uL  261 248   MPV      8.9 - 12.9 FL  10.8 12.0   NRBC      0  WBC  0.0 0.0   ABSOLUTE NRBC      0.00 - 0.01 K/uL  0.00 0.00   NEUTROPHILS      32 - 75 %  47    LYMPHOCYTES      12 - 49 %  41    MONOCYTES      5 - 13 %  11    EOSINOPHILS      0 - 7 %  0    BASOPHILS      0 - 1 %  1    IMMATURE GRANULOCYTES      0.0 - 0.5 %  0    ABS. NEUTROPHILS      1.8 - 8.0 K/UL  3.1    ABS. LYMPHOCYTES      0.8 - 3.5 K/UL  2.6    ABS. MONOCYTES      0.0 - 1.0 K/UL  0.7    ABS. EOSINOPHILS      0.0 - 0.4 K/UL  0.0    ABS. BASOPHILS      0.0 - 0.1 K/UL  0.0    ABS. IMM. GRANS.      0.00 - 0.04 K/UL  0.0    DF        AUTOMATED    Sodium      136 - 145 mmol/L  135 (L) 139   Potassium      3.5 - 5.1 mmol/L  4.3 3.3 (L)   Chloride      97 - 108 mmol/L  104 107   CO2      21 - 32 mmol/L  25 25   Anion gap      5 - 15 mmol/L  6 7   Glucose      65 - 100 mg/dL  149 (H) 100   BUN      6 - 20 MG/DL  16 11   Creatinine      0.55 - 1.02 MG/DL  0.43 (L) 0.39 (L)   BUN/Creatinine ratio      12 - 20    37 (H) 28 (H)   GFR est AA      >60 ml/min/1.73m2  >60 >60   GFR est non-AA      >60 ml/min/1.73m2  >60 >60   Calcium      8.5 - 10.1 MG/DL  9.7 9.8   Bilirubin, total      0.2 - 1.0 MG/DL  0.4 0.4   ALT      12 - 78 U/L  57 51   AST      15 - 37 U/L  55 (H) 30   Alk.  phosphatase      45 - 117 U/L  272 (H) 261 (H)   Protein, total      6.4 - 8.2 g/dL  7.4 7.0   Albumin      3.5 - 5.0 g/dL  3.3 (L) 3.4 (L)   Globulin      2.0 - 4.0 g/dL  4.1 (H) 3.6   A-G Ratio      1.1 - 2.2    0.8 (L) 0.9 (L)   TSH      0.36 - 3.74 uIU/mL <0.01 (L)  <0.01 (L)   Sed rate, automated      0 - 20 mm/hr   9   T4, Free      0.8 - 1.5 NG/DL 1.3  >8.0 (H)   Free Triiodothyronine (T3) 2.2 - 4.0 pg/mL   >30.0 (H)   Thyrotropin Receptor Ab, serum      0.00 - 1.75 IU/L   114.00 (H)       Assessment/Plan:   1. Graves' disease: In July 2022 was diagnosed with Grave's disease when her TSH was < 0.01, FT4 > 8.0, FT3 >30 and TRAb of 114 and started on Methimazole 20 mg bid. Had repeat labs on 10/6/22 that showed TSH < 0.01 and FT4 1.3 and dose was kept the same. At initial visit with me on 11/2/22, I decreased her dose to 30 mg daily for the next 2 months. I told her that she will need to be on treatment for at least 2 years to see if we can induce remission but it's possible with how high her levels were upon diagnosis, that she may not be able to go into remission. Will also watch for thyroid eye disease over time. - decrease methimazole to 30 mg daily  - check TSH and free T4 and TSH receptor ab prior to next visit       Patient Instructions   1) Catie methimazole 30 mg = 3 pastillas juntas todos los stan[place] hasta pastrana flory en [place]. 2) Usualmente con esta condicion, milena persona tiene que frankie pastillas para 2 anos.         Follow-up and Dispositions    Return 1/4/23 at 4pm.             Copy sent to:  Marlee Mosley MD

## 2022-11-02 NOTE — PROGRESS NOTES
HAYDES printed and with the assistance of Hungarian interpretor, Kiet Groves, reviewed with patient. Patient advised of next follow-up appointment to be 1/4 at 4:00pm.  Patient also advised that she would be contacted prior to face-to-face appointment to have labs done. Patient indicated understanding and appreciated the service today.

## 2022-11-02 NOTE — PROGRESS NOTES
Coordination of Care  1. Have you been to the ER, urgent care clinic since your last visit? Hospitalized since your last visit? No    2. Have you seen or consulted any other health care providers outside of the [address] [place] [address] since your last visit? Include any pap smears or colon screening. No    Does the patient need refills? YES    Learning Assessment Complete?  yes  Depression Screening complete in the past 12 months? yes

## 2022-12-22 ENCOUNTER — LAB ONLY (OUTPATIENT)
Dept: FAMILY MEDICINE CLINIC | Age: 27
End: 2022-12-22

## 2022-12-22 ENCOUNTER — HOSPITAL ENCOUNTER (OUTPATIENT)
Dept: LAB | Age: 27
Discharge: HOME | End: 2022-12-22

## 2022-12-22 DIAGNOSIS — E05.00 GRAVES' DISEASE: ICD-10-CM

## 2022-12-22 LAB
T4 FREE SERPL-MCNC: 0.5 NG/DL (ref 0.8–1.5)
TSH SERPL DL<=0.05 MIU/L-ACNC: 0.01 UIU/ML (ref 0.36–3.74)

## 2022-12-22 PROCEDURE — 84443 ASSAY THYROID STIM HORMONE: CPT

## 2022-12-22 PROCEDURE — 83520 IMMUNOASSAY QUANT NOS NONAB: CPT

## 2022-12-22 PROCEDURE — 84439 ASSAY OF FREE THYROXINE: CPT

## 2022-12-22 PROCEDURE — 36415 COLL VENOUS BLD VENIPUNCTURE: CPT

## 2022-12-24 LAB — TSH RECEP AB SER-ACNC: 25.8 IU/L (ref 0–1.75)

## 2023-01-04 ENCOUNTER — OFFICE VISIT (OUTPATIENT)
Dept: FAMILY MEDICINE CLINIC | Age: 28
End: 2023-01-04

## 2023-01-04 VITALS
BODY MASS INDEX: 23.23 KG/M2 | HEART RATE: 58 BPM | SYSTOLIC BLOOD PRESSURE: 112 MMHG | WEIGHT: 108.4 LBS | TEMPERATURE: 98 F | DIASTOLIC BLOOD PRESSURE: 61 MMHG | OXYGEN SATURATION: 100 %

## 2023-01-04 DIAGNOSIS — E05.00 GRAVES' DISEASE: Primary | ICD-10-CM

## 2023-01-04 PROCEDURE — 99214 OFFICE O/P EST MOD 30 MIN: CPT | Performed by: INTERNAL MEDICINE

## 2023-01-04 RX ORDER — METHIMAZOLE 10 MG/1
20 TABLET ORAL DAILY
Qty: 90 TABLET | Refills: 11
Start: 2023-01-04

## 2023-01-04 NOTE — PATIENT INSTRUCTIONS
1) Pastrana nivel de tiroide esta mejor. Entonces toni 2 pastillas de methimazole juntas en la noche.       2) Vamos a chequiar pastrana examenes en 6 semanas

## 2023-01-04 NOTE — PROGRESS NOTES
LACY printed and with the assistance from Nita interpretor #[id number], reviewed with patient. Patient was advised that follow-up appointment is scheduled for 5/3 at 3:00pm.  Patient was advised that Dr. Fara Bello would like her to have some bloodwork done in 6 weeks. Patient was advised that she would be contacted in the next week to schedule the lab appointment for this to be done. Patient indicated understanding and appreciated the service today.

## 2023-01-04 NOTE — PROGRESS NOTES
Coordination of Care  1. Have you been to the ER, urgent care clinic since your last visit? Hospitalized since your last visit? No    2. Have you seen or consulted any other health care providers outside of the [address] [place] [address] since your last visit? Include any pap smears or colon screening. No    Does the patient need refills? no    Learning Assessment Complete?  yes  Depression Screening complete in the past 12 months? yes

## 2023-01-04 NOTE — PROGRESS NOTES
Chief Complaint   Patient presents with    Thyroid Problem     Follow up     History of Present Illness: Won Andrade is a 24 y.o. female here for follow up of thyroid. Weight up 4 lbs since last visit in 11/22. Taking MMI 30 mg daily at night and hasn't missed any doses. Overall energy is ok and about the same as last time. No chest pain or palpitations or tremors. Still has some hair loss that is about the same. No heat or cold intolerance. Periods are normal.  Bowels are mostly regular. Current Outpatient Medications   Medication Sig    methIMAzole (TAPAZOLE) 10 mg tablet Take 3 Tablets by mouth daily. No current facility-administered medications for this visit. No Known Allergies    Review of Systems: PER HPI    Physical Examination:  Blood pressure 112/61, pulse (!) 58, temperature 98 °F (36.7 °C), temperature source Temporal, weight 108 lb 6.4 oz (49.2 kg), last menstrual period 11/28/2022, SpO2 100 %. General: pleasant, no distress, good eye contact   Neck: small goiter, no thyroid bruits  Cardiovascular: regular, normal rate, nl s1 and s2, no m/r/g   Respiratory: clear to auscultation bilaterally   Integumentary: skin is normal, no edema  Neurological: no tremors  Psychiatric: normal mood and affect    Data Reviewed:   Component      Latest Ref Rng & Units 12/22/2022 12/22/2022 12/22/2022          11:19 AM 11:19 AM 11:19 AM   T4, Free      0.8 - 1.5 NG/DL   0.5 (L)   TSH      0.36 - 3.74 uIU/mL  0.01 (L)    Thyrotropin Receptor Ab, serum      0.00 - 1.75 IU/L 25.80 (H)         Assessment/Plan:     1. Graves' disease: In July 2022 was diagnosed with Grave's disease when her TSH was < 0.01, FT4 > 8.0, FT3 >30 and TRAb of 114 and started on Methimazole 20 mg bid. Had repeat labs on 10/6/22 that showed TSH < 0.01 and FT4 1.3 and dose was kept the same.   At initial visit with me on 11/2/22, I decreased her dose to 30 mg daily for the next 2 months and TSH 0.01, FT4 low at 0.5 and TRAb 25.8 in 12/22 so decreased to 20 mg daily. I told her that she will need to be on treatment for at least 2 years to see if we can induce remission but it's possible with how high her levels were upon diagnosis, that she may not be able to go into remission. Will also watch for thyroid eye disease over time. - decrease methimazole to 20 mg daily  - check TSH and free T4 in 6 weeks  - check TSH and free T4 and TSH receptor ab prior to next visit       Patient Instructions   1) Pastrana nivel de tiroide [employer]. Entonces toni 2 pastillas de methimazole juntas en la noche.       2) Vamos a chequiar pastrana examenes en 6 semanas      Follow-up and Dispositions    Return 5/3/23 at 3pm.               Copy sent to:  Kelli Early MD

## 2023-02-17 ENCOUNTER — HOSPITAL ENCOUNTER (OUTPATIENT)
Dept: LAB | Age: 28
Discharge: HOME | End: 2023-02-17

## 2023-02-17 ENCOUNTER — LAB ONLY (OUTPATIENT)
Dept: FAMILY MEDICINE CLINIC | Age: 28
End: 2023-02-17

## 2023-02-17 DIAGNOSIS — E05.00 GRAVES' DISEASE: ICD-10-CM

## 2023-02-18 LAB
T4 FREE SERPL-MCNC: 1 NG/DL (ref 0.8–1.5)
TSH SERPL DL<=0.05 MIU/L-ACNC: <0.01 UIU/ML (ref 0.36–3.74)

## 2023-02-18 PROCEDURE — 84443 ASSAY THYROID STIM HORMONE: CPT

## 2023-02-18 PROCEDURE — 84439 ASSAY OF FREE THYROXINE: CPT

## 2023-02-19 ENCOUNTER — TELEPHONE (OUTPATIENT)
Dept: ENDOCRINOLOGY | Age: 28
End: 2023-02-19

## 2023-02-19 NOTE — TELEPHONE ENCOUNTER
Please call her to let her know her TSH (thyroid test) is still undetectable so she remains hyperthyroid but this often takes several months to normalize so I will keep her dose the same at 2 of the 10 mg tabs daily until she sees me in May.

## 2023-02-22 NOTE — TELEPHONE ENCOUNTER
With the assistance of [facility] and Cortes Segovia, I have attempted to contact this patient several times yesterday and today. Each time, there is a recording that says the number dialed has calling restrictions that is preventing the call from being completed. Call to father of patient at [phone] who did answer and I advised him that the Honorio would like to speak with the patient. He stated that he would tell her to call. I will continue to try to reach her to relay Dr. Sy Simpler message to her.

## 2023-02-23 NOTE — TELEPHONE ENCOUNTER
Patient called early this morning returning call from office, pt can be contacted at [phone] on or after 10:00am

## 2023-02-23 NOTE — TELEPHONE ENCOUNTER
With the assistance of Thai interpretor, Bijan Warren, patient was contacted by telephone number [phone]. She called earlier this morning and gave the [facility] this number. Patient was advised of Dr. Hans Carpio message that her TSH (Thyroid test) is still undetectable so she remains hyperthyroid but this often takes several months to [facility] so she is to keep her dose the same at 2 of the 10mg tabs daily  until she is seen in follow-up on May 3rd at 3:00pm.  She was advised that she would be contacted prior to her F2F appointment in May to schedule a lab appointment to have additional blood work done. Patient indicated understanding and appreciated the telephone call today.

## 2023-05-01 ENCOUNTER — HOSPITAL ENCOUNTER (OUTPATIENT)
Dept: LAB | Age: 28
Discharge: HOME | End: 2023-05-01

## 2023-05-01 ENCOUNTER — LAB ONLY (OUTPATIENT)
Dept: FAMILY MEDICINE CLINIC | Facility: CLINIC | Age: 28
End: 2023-05-01

## 2023-05-01 DIAGNOSIS — E05.00 GRAVES' DISEASE: Primary | ICD-10-CM

## 2023-05-01 DIAGNOSIS — E05.00 GRAVES' DISEASE: ICD-10-CM

## 2023-05-01 PROCEDURE — 36415 COLL VENOUS BLD VENIPUNCTURE: CPT

## 2023-05-01 PROCEDURE — 83520 IMMUNOASSAY QUANT NOS NONAB: CPT

## 2023-05-01 PROCEDURE — 84443 ASSAY THYROID STIM HORMONE: CPT

## 2023-05-01 PROCEDURE — 84439 ASSAY OF FREE THYROXINE: CPT

## 2023-05-01 NOTE — PROGRESS NOTES
Patient here to have bloodwork done for her upcoming appointment with Dr. Alonzo Farrar. Labs drawn and sent for testing. Patient was given a verbal and written reminder of her appointment with Dr. Alonzo Farrar at the [facility] for Wed, May 3rd at 3:30pm.  Patient indicated understanding and appreciated the service today.

## 2023-05-02 LAB
T4 FREE SERPL-MCNC: 0.8 NG/DL (ref 0.8–1.5)
TSH SERPL DL<=0.05 MIU/L-ACNC: 0.01 UIU/ML (ref 0.36–3.74)

## 2023-05-03 ENCOUNTER — DOCUMENTATION ONLY (OUTPATIENT)
Dept: FAMILY MEDICINE CLINIC | Facility: CLINIC | Age: 28
End: 2023-05-03

## 2023-05-03 ENCOUNTER — OFFICE VISIT (OUTPATIENT)
Dept: FAMILY MEDICINE CLINIC | Facility: CLINIC | Age: 28
End: 2023-05-03

## 2023-05-03 VITALS
SYSTOLIC BLOOD PRESSURE: 123 MMHG | OXYGEN SATURATION: 99 % | BODY MASS INDEX: 22.71 KG/M2 | DIASTOLIC BLOOD PRESSURE: 80 MMHG | WEIGHT: 106 LBS | TEMPERATURE: 98.2 F | HEART RATE: 66 BPM

## 2023-05-03 DIAGNOSIS — E05.00 GRAVES' DISEASE: Primary | ICD-10-CM

## 2023-05-03 LAB — TSH RECEP AB SER-ACNC: 9.02 IU/L (ref 0–1.75)

## 2023-05-03 PROCEDURE — 99214 OFFICE O/P EST MOD 30 MIN: CPT | Performed by: INTERNAL MEDICINE

## 2023-08-09 ENCOUNTER — HOSPITAL ENCOUNTER (OUTPATIENT)
Facility: HOSPITAL | Age: 28
Setting detail: SPECIMEN
Discharge: HOME | End: 2023-08-12

## 2023-08-09 ENCOUNTER — NURSE ONLY (OUTPATIENT)
Age: 28
End: 2023-08-09

## 2023-08-09 DIAGNOSIS — E05.00 THYROTOXICOSIS WITH DIFFUSE GOITER WITHOUT THYROTOXIC CRISIS OR STORM: ICD-10-CM

## 2023-08-09 PROCEDURE — 84443 ASSAY THYROID STIM HORMONE: CPT

## 2023-08-09 PROCEDURE — 36415 COLL VENOUS BLD VENIPUNCTURE: CPT

## 2023-08-09 PROCEDURE — 84439 ASSAY OF FREE THYROXINE: CPT

## 2023-08-09 NOTE — PROGRESS NOTES
Patient presented to clinic for lab only appt. Name and  verified. Labs collected : tsh,t4 ordered by Dr Lalo Mendieta. Patient tolerated well.  Trisha Mai RN

## 2023-08-10 LAB
T4 FREE SERPL-MCNC: 0.9 NG/DL (ref 0.8–1.5)
TSH SERPL DL<=0.05 MIU/L-ACNC: 1.09 UIU/ML (ref 0.36–3.74)

## 2023-08-16 ENCOUNTER — OFFICE VISIT (OUTPATIENT)
Age: 28
End: 2023-08-16

## 2023-08-16 VITALS
HEART RATE: 73 BPM | SYSTOLIC BLOOD PRESSURE: 108 MMHG | WEIGHT: 104 LBS | BODY MASS INDEX: 22.44 KG/M2 | RESPIRATION RATE: 22 BRPM | HEIGHT: 57 IN | DIASTOLIC BLOOD PRESSURE: 65 MMHG | OXYGEN SATURATION: 97 % | TEMPERATURE: 98.1 F

## 2023-08-16 DIAGNOSIS — E05.00 GRAVES' DISEASE: Primary | ICD-10-CM

## 2023-08-16 PROCEDURE — 99214 OFFICE O/P EST MOD 30 MIN: CPT | Performed by: INTERNAL MEDICINE

## 2023-08-16 ASSESSMENT — LIFESTYLE VARIABLES
HOW OFTEN DO YOU HAVE A DRINK CONTAINING ALCOHOL: NEVER
HOW MANY STANDARD DRINKS CONTAINING ALCOHOL DO YOU HAVE ON A TYPICAL DAY: PATIENT DOES NOT DRINK

## 2023-08-16 NOTE — PROGRESS NOTES
AVS printed and reviewed with patient. Patient was advised that her follow-up appointment would be scheduled for Wed, 1/17/24 at 2:40pm.  Dr. Osorio Palencia has ordered nonfasting bloodwork to be done in 2 months so patient was escorted to the  where a labs only appointment is scheduled for 10/3 at 10:30am.  Patient indicated understanding and appreciated the service today.

## 2023-08-16 NOTE — PATIENT INSTRUCTIONS
1) Mantenga lucas dosis de methimazole 2 pastillas diario para 2 mas meses y vamos a checquiar lucas examenes en 2 meses.

## 2023-10-03 ENCOUNTER — HOSPITAL ENCOUNTER (OUTPATIENT)
Facility: HOSPITAL | Age: 28
Setting detail: SPECIMEN
Discharge: HOME | End: 2023-10-06

## 2023-10-03 ENCOUNTER — NURSE ONLY (OUTPATIENT)
Age: 28
End: 2023-10-03

## 2023-10-03 DIAGNOSIS — E05.00 GRAVES' DISEASE: Primary | ICD-10-CM

## 2023-10-03 PROCEDURE — 84439 ASSAY OF FREE THYROXINE: CPT

## 2023-10-03 PROCEDURE — 84443 ASSAY THYROID STIM HORMONE: CPT

## 2023-10-03 PROCEDURE — 36415 COLL VENOUS BLD VENIPUNCTURE: CPT

## 2023-10-04 LAB
T4 FREE SERPL-MCNC: 0.8 NG/DL (ref 0.8–1.5)
TSH SERPL DL<=0.05 MIU/L-ACNC: 2.65 UIU/ML (ref 0.36–3.74)

## 2023-10-05 ENCOUNTER — TELEPHONE (OUTPATIENT)
Age: 28
End: 2023-10-05

## 2023-10-12 ENCOUNTER — TELEPHONE (OUTPATIENT)
Age: 28
End: 2023-10-12

## 2023-10-12 NOTE — TELEPHONE ENCOUNTER
Per Kathy Rosa:    lab results with medication change per Dr. Nancy Mccabe (With the assistance of [facility] line, contacted patient by telephone and advised her per Dr. Nancy Mccabe that her TSH (thyroid test) level is 2.65 which is normal but above the goal of 0.45-2.0. Advised her that her methimazole is working well but more than she needs. Advised her that Dr. Nancy Mccabe is advising her to decrease her dose to 15mg daily by taking 1.5 of the 10mg tabs everyday.   Patient indicated understanding and appreciated the service today.  )

## 2023-12-28 ENCOUNTER — TELEPHONE (OUTPATIENT)
Age: 28
End: 2023-12-28

## 2023-12-28 NOTE — TELEPHONE ENCOUNTER
Chief Complaint   Patient presents with    Medication Check     Follow up if patient has received / picked up medication.  [facility] services:  [id number]. Ramos Mead LPN    Patient name and date of birth verified by . Patient confirmed that she has picked up her medication methimazole from her [address] [place] [address] Avenue. Patient had no questions at time of call.    Ramos Mead LPN

## 2024-01-16 ENCOUNTER — HOSPITAL ENCOUNTER (OUTPATIENT)
Facility: HOSPITAL | Age: 29
Setting detail: SPECIMEN
Discharge: HOME | End: 2024-01-19

## 2024-01-16 ENCOUNTER — NURSE ONLY (OUTPATIENT)
Age: 29
End: 2024-01-16

## 2024-01-16 DIAGNOSIS — E05.00 GRAVES' DISEASE: ICD-10-CM

## 2024-01-16 LAB
T4 FREE SERPL-MCNC: 0.9 NG/DL (ref 0.8–1.5)
TSH SERPL DL<=0.05 MIU/L-ACNC: 0.39 UIU/ML (ref 0.36–3.74)

## 2024-01-16 PROCEDURE — 83520 IMMUNOASSAY QUANT NOS NONAB: CPT

## 2024-01-16 PROCEDURE — 84443 ASSAY THYROID STIM HORMONE: CPT

## 2024-01-16 PROCEDURE — 36415 COLL VENOUS BLD VENIPUNCTURE: CPT

## 2024-01-16 PROCEDURE — 84439 ASSAY OF FREE THYROXINE: CPT

## 2024-01-16 NOTE — PROGRESS NOTES
Name and  verified. Ordered lab collected without difficulty. Patient discharged in stable condition.

## 2024-01-17 ENCOUNTER — OFFICE VISIT (OUTPATIENT)
Age: 29
End: 2024-01-17

## 2024-01-17 VITALS
WEIGHT: 99.8 LBS | BODY MASS INDEX: 21.38 KG/M2 | SYSTOLIC BLOOD PRESSURE: 118 MMHG | OXYGEN SATURATION: 98 % | TEMPERATURE: 97.3 F | DIASTOLIC BLOOD PRESSURE: 70 MMHG | HEART RATE: 70 BPM

## 2024-01-17 DIAGNOSIS — E05.00 GRAVES' DISEASE: Primary | ICD-10-CM

## 2024-01-17 PROCEDURE — 99214 OFFICE O/P EST MOD 30 MIN: CPT | Performed by: INTERNAL MEDICINE

## 2024-01-17 SDOH — ECONOMIC STABILITY: INCOME INSECURITY: HOW HARD IS IT FOR YOU TO PAY FOR THE VERY BASICS LIKE FOOD, HOUSING, MEDICAL CARE, AND HEATING?: NOT HARD AT ALL

## 2024-01-17 SDOH — ECONOMIC STABILITY: FOOD INSECURITY: WITHIN THE PAST 12 MONTHS, THE FOOD YOU BOUGHT JUST DIDN'T LAST AND YOU DIDN'T HAVE MONEY TO GET MORE.: NEVER TRUE

## 2024-01-17 SDOH — ECONOMIC STABILITY: FOOD INSECURITY: WITHIN THE PAST 12 MONTHS, YOU WORRIED THAT YOUR FOOD WOULD RUN OUT BEFORE YOU GOT MONEY TO BUY MORE.: NEVER TRUE

## 2024-01-17 SDOH — ECONOMIC STABILITY: HOUSING INSECURITY
IN THE LAST 12 MONTHS, WAS THERE A TIME WHEN YOU DID NOT HAVE A STEADY PLACE TO SLEEP OR SLEPT IN A SHELTER (INCLUDING NOW)?: NO

## 2024-01-17 ASSESSMENT — PATIENT HEALTH QUESTIONNAIRE - PHQ9
1. LITTLE INTEREST OR PLEASURE IN DOING THINGS: 0
SUM OF ALL RESPONSES TO PHQ QUESTIONS 1-9: 0
SUM OF ALL RESPONSES TO PHQ9 QUESTIONS 1 & 2: 0
SUM OF ALL RESPONSES TO PHQ QUESTIONS 1-9: 0
2. FEELING DOWN, DEPRESSED OR HOPELESS: 0

## 2024-01-17 ASSESSMENT — SOCIAL DETERMINANTS OF HEALTH (SDOH)
WITHIN THE LAST YEAR, HAVE TO BEEN RAPED OR FORCED TO HAVE ANY KIND OF SEXUAL ACTIVITY BY YOUR PARTNER OR EX-PARTNER?: NO
WITHIN THE LAST YEAR, HAVE YOU BEEN HUMILIATED OR EMOTIONALLY ABUSED IN OTHER WAYS BY YOUR PARTNER OR EX-PARTNER?: NO
WITHIN THE LAST YEAR, HAVE YOU BEEN KICKED, HIT, SLAPPED, OR OTHERWISE PHYSICALLY HURT BY YOUR PARTNER OR EX-PARTNER?: NO
WITHIN THE LAST YEAR, HAVE YOU BEEN AFRAID OF YOUR PARTNER OR EX-PARTNER?: NO

## 2024-01-17 NOTE — PROGRESS NOTES
Chief Complaint   Patient presents with    Follow-up    Thyroid Problem    Medication Refill    Insomnia     History of Present Illness: Julisa Davey [facility] is a 28 y.o. female here for follow up of thyroid.  Weight down 5 lbs since last visit in 8/23.  Compliant with methimazole 15 mg daily.  Rare chest pain and tremors.  No hair loss.  No heat or cold intolerance.  Periods are regular.  Bowels are mostly regular.  Has had some pressure in both ears affecting her ability to sleep and advised her to try allergy medication to see if this helps and if not, then she will need to make an appointment with a PCP at [facility].      Current Outpatient Medications   Medication Sig    methIMAzole (TAPAZOLE) 10 MG tablet Take 1.5 tablets by mouth daily     No current facility-administered medications for this visit.     No Known Allergies    Review of Systems: PER HPI    Physical Examination:  Blood pressure 118/70, pulse 70, temperature 97.3 °F (36.3 °C), temperature source Temporal, weight 45.3 kg (99 lb 12.8 oz), SpO2 98 %.  General: pleasant, no distress, good eye contact   Neck: small goiter  Cardiovascular: regular, normal rate, nl s1 and s2, no m/r/g   Respiratory: clear to auscultation bilaterally   Integumentary: skin is normal, no edema  Neurological: reflexes 2+ at biceps, no tremors  Psychiatric: normal mood and affect    Data Reviewed:   Component      Latest Ref Rng 1/16/2024   TSH, 3RD GENERATION      0.36 - 3.74 uIU/mL 0.39    T4 Free      0.8 - 1.5 NG/DL 0.9          Assessment/Plan:     1. Graves' disease: In July 2022 was diagnosed with Grave's disease when her TSH was < 0.01, FT4 > 8.0, FT3 >30 and TRAb of 114 and started on Methimazole 20 mg bid.  Had repeat labs on 10/6/22 that showed TSH < 0.01 and FT4 1.3 and dose was kept the same.  At initial visit with me on 11/2/22, I decreased her dose to 30 mg daily for the next 2 months and TSH 0.01, FT4 low at 0.5 and TRAb 25.8 in 12/22 so decreased to 20 mg

## 2024-01-17 NOTE — PROGRESS NOTES
Patient name and date of birth verified.  Patient given an after visit summary, reviewed next scheduled appointment May 15, 2024 at 3 pm.  Patient will be scheduled lab appointment prior to next scheduled appointment.  Sierra Funez LPN     Hide Include Location In Plan Question?: No Detail Level: Zone Detail Level: Detailed

## 2024-01-17 NOTE — PATIENT INSTRUCTIONS
1) Roth TSH (examen de tiroides) esta normal.  Sigue tomando meri y media de methimazole todos los kaufman.      2) Puede claire allegra o zyrtec or claritin 1 pastilla antes de dormir para ayudar con lucas simptomas.

## 2024-01-17 NOTE — PROGRESS NOTES
Chief Complaint   Patient presents with    Follow-up    Thyroid Problem    Medication Refill    Insomnia    /70 (Site: Right Upper Arm, Position: Sitting, Cuff Size: Medium Adult)   Pulse 70   Temp 97.3 °F (36.3 °C) (Temporal)   Wt 45.3 kg (99 lb 12.8 oz)   SpO2 98%   BMI 21.38 kg/m²      \"Have you been to the ER, urgent care clinic since your last visit?  Hospitalized since your last visit?\"    NO    “Have you seen or consulted any other health care providers outside of [facility] since your last visit?”    NO

## 2024-01-18 LAB — TSH RECEP AB SER-ACNC: 2.15 IU/L (ref 0–1.75)

## 2024-01-25 RX ORDER — METHIMAZOLE 10 MG/1
15 TABLET ORAL EVERY MORNING
Qty: 45 TABLET | Refills: 11 | Status: SHIPPED | OUTPATIENT
Start: 2024-01-25

## 2024-03-19 NOTE — PROGRESS NOTES
Patient here to have 2 month labs for Dr. Bridgett Garrett.   TSH and T4 drawn, labeled and sent to lab for testing
no concerns

## 2024-05-07 ENCOUNTER — NURSE ONLY (OUTPATIENT)
Age: 29
End: 2024-05-07

## 2024-05-07 ENCOUNTER — HOSPITAL ENCOUNTER (OUTPATIENT)
Facility: HOSPITAL | Age: 29
Setting detail: SPECIMEN
Discharge: HOME | End: 2024-05-10

## 2024-05-07 DIAGNOSIS — E05.00 GRAVES' DISEASE: ICD-10-CM

## 2024-05-07 PROCEDURE — 83520 IMMUNOASSAY QUANT NOS NONAB: CPT

## 2024-05-07 PROCEDURE — 84439 ASSAY OF FREE THYROXINE: CPT

## 2024-05-07 PROCEDURE — 84443 ASSAY THYROID STIM HORMONE: CPT

## 2024-05-07 PROCEDURE — 36415 COLL VENOUS BLD VENIPUNCTURE: CPT

## 2024-05-07 NOTE — PROGRESS NOTES
Julisa Davey [facility] presents for lab only appt, pt's name and  verified. Labs collected per 's order: T4 Free, TSH, and Thyrotropin Receptor Ab. Lab draw performed without any complications  Yadi Lopez RN

## 2024-05-08 LAB
T4 FREE SERPL-MCNC: 1 NG/DL (ref 0.8–1.5)
TSH SERPL DL<=0.05 MIU/L-ACNC: 1.67 UIU/ML (ref 0.36–3.74)

## 2024-05-10 LAB — TSH RECEP AB SER-ACNC: 1.25 IU/L (ref 0–1.75)

## 2024-05-15 ENCOUNTER — OFFICE VISIT (OUTPATIENT)
Age: 29
End: 2024-05-15

## 2024-05-15 VITALS
TEMPERATURE: 99 F | BODY MASS INDEX: 21.15 KG/M2 | WEIGHT: 98.7 LBS | HEART RATE: 72 BPM | SYSTOLIC BLOOD PRESSURE: 100 MMHG | DIASTOLIC BLOOD PRESSURE: 62 MMHG | OXYGEN SATURATION: 95 %

## 2024-05-15 DIAGNOSIS — E05.00 GRAVES' DISEASE: Primary | ICD-10-CM

## 2024-05-15 PROCEDURE — 99214 OFFICE O/P EST MOD 30 MIN: CPT | Performed by: INTERNAL MEDICINE

## 2024-05-15 RX ORDER — METHIMAZOLE 10 MG/1
10 TABLET ORAL EVERY MORNING
Qty: 45 TABLET | Refills: 11
Start: 2024-05-15

## 2024-05-15 NOTE — PROGRESS NOTES
Chief Complaint   Patient presents with    Graves' Disease    /62 (Site: Right Upper Arm, Position: Sitting, Cuff Size: Medium Adult)   Pulse 72   Temp 99 °F (37.2 °C) (Temporal)   Wt 44.8 kg (98 lb 11.2 oz)   LMP 05/02/2024 (Approximate)   SpO2 95%   BMI 21.15 kg/m²    \"Have you been to the ER, urgent care clinic since your last visit?  Hospitalized since your last visit?\"    NO    “Have you seen or consulted any other health care providers outside of [facility] since your last visit?”    NO    Bjorn Nassar RN             Click Here for Release of Records Request

## 2024-05-15 NOTE — PROGRESS NOTES
Chief Complaint   Patient presents with    Graves' Disease     History of Present Illness: Julisa Davey [facility] is a 29 y.o. female here for follow up of thyroid.  Weight down 1 lbs since last visit in 1/24.  Compliant with MMI 15 mg daily and overall energy is good.  No chest pain or palpitations or tremors.  No heat or cold intolerance.  May still have some mild hair loss that is unchanged.  Bowels are regular.        Current Outpatient Medications   Medication Sig    methIMAzole (TAPAZOLE) 10 MG tablet Take 1.5 tablets by mouth every morning Delete prescription sent in error for 2 tabs daily by Dr. Colorado     No current facility-administered medications for this visit.     No Known Allergies    Review of Systems: PER HPI    Physical Examination:  Blood pressure 100/62, pulse 72, temperature 99 °F (37.2 °C), temperature source Temporal, weight 44.8 kg (98 lb 11.2 oz), last menstrual period 05/02/2024, SpO2 95 %.  General: pleasant, no distress, good eye contact   Neck: small goiter  Cardiovascular: regular, normal rate, nl s1 and s2, no m/r/g   Respiratory: clear to auscultation bilaterally   Integumentary: skin is normal, no edema  Neurological: reflexes 2+ at biceps, no tremors  Psychiatric: normal mood and affect    Data Reviewed:   Component      Latest Ref Rng 5/7/2024   T4 Free      0.8 - 1.5 NG/DL 1.0    TSH, 3rd Generation      0.36 - 3.74 uIU/mL 1.67    Thyrotropin Receptor Ab      0.00 - 1.75 IU/L 1.25          Assessment/Plan:     1. Graves' disease: In July 2022 was diagnosed with Grave's disease when her TSH was < 0.01, FT4 > 8.0, FT3 >30 and TRAb of 114 and started on Methimazole 20 mg bid.  Had repeat labs on 10/6/22 that showed TSH < 0.01 and FT4 1.3 and dose was kept the same.  At initial visit with me on 11/2/22, I decreased her dose to 30 mg daily for the next 2 months and TSH 0.01, FT4 low at 0.5 and TRAb 25.8 in 12/22 so decreased to 20 mg daily and TSH < 0.01 and FT4 1.0 in 2/23 so kept dose the

## 2024-05-15 NOTE — PROGRESS NOTES
Name and  confirmed w/ patient. An After Visit Summary was provided and all discharge instructions were reviewed with the patient including: medication changes and follow up. Time for questions and answers provided, patient verbalized understanding. Patient discharged from clinic in stable condition. [facility]  Vance assisted with interpretation.

## 2024-05-15 NOTE — PATIENT INSTRUCTIONS
1) Nguyen TSH (examen de tiroide) esta normal.  Entonces puede claire kellie pastilla de methimazole diario y no marito kellie y media.    2) Nguyen TSH receptor antecuerpo esta normal dice que nguyen nivel de sistema imunologica esta mejorando.

## 2024-07-03 ENCOUNTER — TELEPHONE (OUTPATIENT)
Age: 29
End: 2024-07-03

## 2024-07-03 NOTE — TELEPHONE ENCOUNTER
Patient scheduled for labs on 7/29 at 11:00am before her appt with Dr. Stein on 08/07.     [facility]  [phone] assisted with interpretation.

## 2024-07-29 ENCOUNTER — HOSPITAL ENCOUNTER (OUTPATIENT)
Facility: HOSPITAL | Age: 29
Setting detail: SPECIMEN
Discharge: HOME | End: 2024-08-01

## 2024-07-29 ENCOUNTER — LAB (OUTPATIENT)
Age: 29
End: 2024-07-29

## 2024-07-29 DIAGNOSIS — E05.00 GRAVES' DISEASE: ICD-10-CM

## 2024-07-29 LAB
T4 FREE SERPL-MCNC: 0.9 NG/DL (ref 0.8–1.5)
TSH SERPL DL<=0.05 MIU/L-ACNC: 1.2 UIU/ML (ref 0.36–3.74)

## 2024-07-29 PROCEDURE — 84443 ASSAY THYROID STIM HORMONE: CPT

## 2024-07-29 PROCEDURE — 36415 COLL VENOUS BLD VENIPUNCTURE: CPT

## 2024-07-29 PROCEDURE — 84439 ASSAY OF FREE THYROXINE: CPT

## 2024-07-29 PROCEDURE — 83520 IMMUNOASSAY QUANT NOS NONAB: CPT

## 2024-07-31 LAB — TSH RECEP AB SER-ACNC: <1.1 IU/L (ref 0–1.75)

## 2024-08-07 ENCOUNTER — TELEMEDICINE (OUTPATIENT)
Age: 29
End: 2024-08-07

## 2024-08-07 DIAGNOSIS — E05.00 GRAVES' DISEASE: Primary | ICD-10-CM

## 2024-08-07 PROCEDURE — 99443 PR PHYS/QHP TELEPHONE EVALUATION 21-30 MIN: CPT | Performed by: INTERNAL MEDICINE

## 2024-08-07 RX ORDER — METHIMAZOLE 10 MG/1
10 TABLET ORAL EVERY MORNING
Qty: 45 TABLET | Refills: 11 | Status: SHIPPED
Start: 2024-08-07

## 2024-08-07 SDOH — HEALTH STABILITY: PHYSICAL HEALTH: ON AVERAGE, HOW MANY DAYS PER WEEK DO YOU ENGAGE IN MODERATE TO STRENUOUS EXERCISE (LIKE A BRISK WALK)?: 0 DAYS

## 2024-08-07 SDOH — SOCIAL STABILITY: SOCIAL NETWORK: ARE YOU MARRIED, WIDOWED, DIVORCED, SEPARATED, NEVER MARRIED, OR LIVING WITH A PARTNER?: NEVER MARRIED

## 2024-08-07 SDOH — HEALTH STABILITY: MENTAL HEALTH: HOW OFTEN DO YOU HAVE A DRINK CONTAINING ALCOHOL?: NEVER

## 2024-08-07 SDOH — SOCIAL STABILITY: SOCIAL NETWORK
DO YOU BELONG TO ANY CLUBS OR ORGANIZATIONS SUCH AS CHURCH GROUPS UNIONS, FRATERNAL OR ATHLETIC GROUPS, OR SCHOOL GROUPS?: NO

## 2024-08-07 SDOH — SOCIAL STABILITY: SOCIAL NETWORK: IN A TYPICAL WEEK, HOW MANY TIMES DO YOU TALK ON THE PHONE WITH FAMILY, FRIENDS, OR NEIGHBORS?: THREE TIMES A WEEK

## 2024-08-07 SDOH — HEALTH STABILITY: MENTAL HEALTH: HOW MANY STANDARD DRINKS CONTAINING ALCOHOL DO YOU HAVE ON A TYPICAL DAY?: PATIENT DOES NOT DRINK

## 2024-08-07 SDOH — SOCIAL STABILITY: SOCIAL NETWORK: HOW OFTEN DO YOU GET TOGETHER WITH FRIENDS OR RELATIVES?: ONCE A WEEK

## 2024-08-07 SDOH — HEALTH STABILITY: MENTAL HEALTH
STRESS IS WHEN SOMEONE FEELS TENSE, NERVOUS, ANXIOUS, OR CAN'T SLEEP AT NIGHT BECAUSE THEIR MIND IS TROUBLED. HOW STRESSED ARE YOU?: NOT AT ALL

## 2024-08-07 SDOH — SOCIAL STABILITY: SOCIAL NETWORK: HOW OFTEN DO YOU ATTEND CHURCH OR RELIGIOUS SERVICES?: 1 TO 4 TIMES PER YEAR

## 2024-08-07 SDOH — HEALTH STABILITY: PHYSICAL HEALTH: ON AVERAGE, HOW MANY MINUTES DO YOU ENGAGE IN EXERCISE AT THIS LEVEL?: 0 MIN

## 2024-08-07 SDOH — SOCIAL STABILITY: SOCIAL NETWORK: HOW OFTEN DO YOU ATTENT MEETINGS OF THE CLUB OR ORGANIZATION YOU BELONG TO?: NEVER

## 2024-08-07 ASSESSMENT — PATIENT HEALTH QUESTIONNAIRE - PHQ9
SUM OF ALL RESPONSES TO PHQ QUESTIONS 1-9: 0
SUM OF ALL RESPONSES TO PHQ QUESTIONS 1-9: 0
1. LITTLE INTEREST OR PLEASURE IN DOING THINGS: NOT AT ALL
SUM OF ALL RESPONSES TO PHQ QUESTIONS 1-9: 0
2. FEELING DOWN, DEPRESSED OR HOPELESS: NOT AT ALL
SUM OF ALL RESPONSES TO PHQ9 QUESTIONS 1 & 2: 0
SUM OF ALL RESPONSES TO PHQ QUESTIONS 1-9: 0

## 2024-08-07 NOTE — PROGRESS NOTES
\"Have you been to the ER, urgent care clinic since your last visit?  Hospitalized since your last visit?\"    NO    “Have you seen or consulted any other health care providers outside of [facility] since your last visit?”    NO     “Have you had a pap smear?”    NO    No cervical cancer screening on file             Click Here for Release of Records Request

## 2024-08-07 NOTE — PROGRESS NOTES
Chief Complaint   Patient presents with    Follow-up     Graves disease f/u        **THIS IS A VIRTUAL VISIT VIA A TELEPHONE ENCOUNTER.  PATIENT AGREED TO HAVE THEIR CARE DELIVERED OVER THE PHONE IN PLACE OF THEIR REGULARLY SCHEDULED OFFICE VISIT**    History of Present Illness: Julisa Streeter is a 29 y.o. female here for follow up of thyroid.  Compliant with MMI 10 mg daily.  Overall energy is good.  No chest pain or palpitations or tremors.  Can have some hair loss that is stable.  There are days when she feels cold and others when she feels normal.  Bowels are regular.      Current Outpatient Medications   Medication Sig    methIMAzole (TAPAZOLE) 10 MG tablet Take 1 tablet by mouth daily--Delete prescription sent in error for 2 tabs daily by Dr. [place]--Dose change 05/15/24--updated med list--did not send prescription to the pharmacy     No current facility-administered medications for this visit.     No Known Allergies    Review of Systems: PER HPI    Physical Examination: NOT PERFORMED DUE TO THIS BEING A TELEPHONE CALL      Data Reviewed:   - none new for review    Assessment/Plan:   1. Graves' disease: In July 2022 was diagnosed with Grave's disease when her TSH was < 0.01, FT4 > 8.0, FT3 >30 and TRAb of 114 and started on Methimazole 20 mg bid.  Had repeat labs on 10/6/22 that showed TSH < 0.01 and FT4 1.3 and dose was kept the same.  At initial visit with me on 11/2/22, I decreased her dose to 30 mg daily for the next 2 months and TSH 0.01, FT4 low at 0.5 and TRAb 25.8 in 12/22 so decreased to 20 mg daily and TSH < 0.01 and FT4 1.0 in 2/23 so kept dose the same.  TSH 0.01 and FT4 0.8 and TRAb 9.02 in 5/23 so kept dose the same and TSH 1.09 and FT4 0.9 in 8/23 so kept dose the same for 2 more months and TSH 2.65 in 10/23 so decreased to 15 mg daily and TSH 0.39 and TRAb 2.15 in 1/24 so kept dose the same.  TSH 1.67 and TRAb 1.25 in 5/24 so decreased to 10 mg daily and TSH 1.20 and TRAb < 1.1 in 7/24 so

## 2024-10-09 ENCOUNTER — TELEPHONE (OUTPATIENT)
Age: 29
End: 2024-10-09

## 2024-10-09 NOTE — TELEPHONE ENCOUNTER
Attempted to call patient on both listed numbers to schedule her for lab appt before her visit with Dr. Stein on 11/06.     Unable to leave a voicemail at this time. [facility]  [phone] assisted.

## 2024-11-04 ENCOUNTER — HOSPITAL ENCOUNTER (OUTPATIENT)
Facility: HOSPITAL | Age: 29
Setting detail: SPECIMEN
Discharge: HOME | End: 2024-11-07

## 2024-11-04 ENCOUNTER — LAB (OUTPATIENT)
Age: 29
End: 2024-11-04

## 2024-11-04 DIAGNOSIS — E05.00 GRAVES' DISEASE: ICD-10-CM

## 2024-11-04 LAB
T4 FREE SERPL-MCNC: 1.2 NG/DL (ref 0.8–1.5)
TSH SERPL DL<=0.05 MIU/L-ACNC: 0.25 UIU/ML (ref 0.36–3.74)

## 2024-11-04 PROCEDURE — 84439 ASSAY OF FREE THYROXINE: CPT

## 2024-11-04 PROCEDURE — 83520 IMMUNOASSAY QUANT NOS NONAB: CPT

## 2024-11-04 PROCEDURE — 84443 ASSAY THYROID STIM HORMONE: CPT

## 2024-11-04 NOTE — PROGRESS NOTES
The patient presented to the clinic this morning for a lab visit. Ordered labs drawn without difficulty by ROSEMARIE Funez LPN. The patient was discharged in stable condition.

## 2024-11-06 ENCOUNTER — OFFICE VISIT (OUTPATIENT)
Age: 29
End: 2024-11-06

## 2024-11-06 VITALS
HEART RATE: 62 BPM | DIASTOLIC BLOOD PRESSURE: 62 MMHG | SYSTOLIC BLOOD PRESSURE: 103 MMHG | TEMPERATURE: 98.4 F | BODY MASS INDEX: 20.96 KG/M2 | OXYGEN SATURATION: 99 % | WEIGHT: 97.8 LBS

## 2024-11-06 DIAGNOSIS — E05.00 GRAVES' DISEASE: Primary | ICD-10-CM

## 2024-11-06 LAB — TSH RECEP AB SER-ACNC: <1.1 IU/L (ref 0–1.75)

## 2024-11-06 PROCEDURE — 99214 OFFICE O/P EST MOD 30 MIN: CPT | Performed by: INTERNAL MEDICINE

## 2024-11-06 RX ORDER — METHIMAZOLE 10 MG/1
10 TABLET ORAL EVERY MORNING
Qty: 45 TABLET | Refills: 11 | Status: SHIPPED
Start: 2024-11-06

## 2024-11-06 NOTE — PROGRESS NOTES
Chief Complaint   Patient presents with    Thyroid Problem    Medication Refill     History of Present Illness: Julisa Davey [facility] is a 29 y.o. female here for follow up of thyroid.  Weight down 1 lbs since last visit in person in 5/24.  Has been taking MMI 10 mg 6x/week.  No chest pain or palpitations or tremors.  No change in hair.  At times can feel cold.  Bowels are regular.        Current Outpatient Medications   Medication Sig    methIMAzole (TAPAZOLE) 10 MG tablet Take 1 tablet by mouth every morning On Mon-Sat and none on Sun--Delete prescription sent in error for 2 tabs daily by Dr. Colorado--Dose change 08/07/24--updated med list--did not send prescription to the pharmacy     No current facility-administered medications for this visit.     No Known Allergies    Review of Systems: PER HPI    Physical Examination:  Blood pressure 103/62, pulse 62, temperature 98.4 °F (36.9 °C), temperature source Temporal, weight 44.4 kg (97 lb 12.8 oz), last menstrual period 09/13/2024, SpO2 99%.  General: pleasant, no distress, good eye contact   Neck: small goiter  Cardiovascular: regular, normal rate, nl s1 and s2, no m/r/g   Respiratory: clear to auscultation bilaterally   Integumentary: skin is normal, no edema  Neurological: reflexes 2+ at biceps, no tremors  Psychiatric: normal mood and affect    Data Reviewed:   Component      Latest Ref Rng 11/4/2024   Thyrotropin Receptor Ab      0.00 - 1.75 IU/L <1.10    TSH, 3rd Generation      0.36 - 3.74 uIU/mL 0.25 (L)    T4 Free      0.8 - 1.5 NG/DL 1.2        Assessment/Plan:     1. Graves' disease: In July 2022 was diagnosed with Grave's disease when her TSH was < 0.01, FT4 > 8.0, FT3 >30 and TRAb of 114 and started on Methimazole 20 mg bid.  Had repeat labs on 10/6/22 that showed TSH < 0.01 and FT4 1.3 and dose was kept the same.  At initial visit with me on 11/2/22, I decreased her dose to 30 mg daily for the next 2 months and TSH 0.01, FT4 low at 0.5 and TRAb 25.8 in 12/22

## 2024-11-06 NOTE — PROGRESS NOTES
Name and  confirmed w/ patient. An After Visit Summary was provided and all discharge instructions were reviewed with the patient including: f/up appt. Time for questions and answers provided, patient verbalized understanding. Patient discharged from clinic in stable condition.  details per consult note.

## 2024-11-06 NOTE — PROGRESS NOTES
Chief Complaint   Patient presents with    Thyroid Problem    Medication Refill     /62 (Site: Right Upper Arm, Position: Sitting, Cuff Size: Medium Adult)   Pulse 62   Temp 98.4 °F (36.9 °C) (Temporal)   Wt 44.4 kg (97 lb 12.8 oz)   LMP 09/13/2024 (Exact Date)   SpO2 99%   BMI 20.96 kg/m²     \"Have you been to the ER, urgent care clinic since your last visit?  Hospitalized since your last visit?\"    NO    “Have you seen or consulted any other health care providers outside of [facility] since your last visit?”    NO     “Have you had a pap smear?”    NO    No cervical cancer screening on file       Click Here for Release of Records Request

## 2025-02-04 RX ORDER — METHIMAZOLE 10 MG/1
TABLET ORAL
Qty: 85 TABLET | Refills: 3 | Status: SHIPPED | OUTPATIENT
Start: 2025-02-04

## 2025-02-05 ENCOUNTER — TELEPHONE (OUTPATIENT)
Age: 30
End: 2025-02-05

## 2025-02-05 RX ORDER — METHIMAZOLE 10 MG/1
TABLET ORAL
Qty: 45 TABLET | Refills: 0 | OUTPATIENT
Start: 2025-02-05

## 2025-02-06 ENCOUNTER — HOSPITAL ENCOUNTER (OUTPATIENT)
Facility: HOSPITAL | Age: 30
Setting detail: SPECIMEN
Discharge: HOME | End: 2025-02-09

## 2025-02-06 ENCOUNTER — LAB (OUTPATIENT)
Age: 30
End: 2025-02-06

## 2025-02-06 DIAGNOSIS — E05.00 GRAVES' DISEASE: ICD-10-CM

## 2025-02-06 PROCEDURE — 84439 ASSAY OF FREE THYROXINE: CPT

## 2025-02-06 PROCEDURE — 84443 ASSAY THYROID STIM HORMONE: CPT

## 2025-02-06 PROCEDURE — 83520 IMMUNOASSAY QUANT NOS NONAB: CPT

## 2025-02-06 NOTE — PROGRESS NOTES
Patient presented to clinic for lab only appt. Name and  verified. Labs collected : TSH,T4, Thyrotropin Receptor AB ordered by Dr Seng Stein MD. Labs colleted by Sierra Funez LPN.  Patient tolerated well. Tisha Crooks RN

## 2025-02-07 LAB
T4 FREE SERPL-MCNC: 1.4 NG/DL (ref 0.8–1.5)
TSH SERPL DL<=0.05 MIU/L-ACNC: 0.01 UIU/ML (ref 0.36–3.74)

## 2025-02-09 LAB — TSH RECEP AB SER-ACNC: <1.1 IU/L (ref 0–1.75)

## 2025-02-19 ENCOUNTER — TELEMEDICINE (OUTPATIENT)
Age: 30
End: 2025-02-19

## 2025-02-19 DIAGNOSIS — E05.00 GRAVES' DISEASE: Primary | ICD-10-CM

## 2025-02-19 PROCEDURE — 99212 OFFICE O/P EST SF 10 MIN: CPT | Performed by: INTERNAL MEDICINE

## 2025-02-19 RX ORDER — METHIMAZOLE 10 MG/1
TABLET ORAL
Qty: 120 TABLET | Refills: 3 | Status: SHIPPED | OUTPATIENT
Start: 2025-02-19

## 2025-02-19 SDOH — ECONOMIC STABILITY: FOOD INSECURITY: WITHIN THE PAST 12 MONTHS, YOU WORRIED THAT YOUR FOOD WOULD RUN OUT BEFORE YOU GOT MONEY TO BUY MORE.: NEVER TRUE

## 2025-02-19 SDOH — ECONOMIC STABILITY: FOOD INSECURITY: WITHIN THE PAST 12 MONTHS, THE FOOD YOU BOUGHT JUST DIDN'T LAST AND YOU DIDN'T HAVE MONEY TO GET MORE.: NEVER TRUE

## 2025-02-19 ASSESSMENT — PATIENT HEALTH QUESTIONNAIRE - PHQ9
SUM OF ALL RESPONSES TO PHQ QUESTIONS 1-9: 0
SUM OF ALL RESPONSES TO PHQ9 QUESTIONS 1 & 2: 0
SUM OF ALL RESPONSES TO PHQ QUESTIONS 1-9: 0
SUM OF ALL RESPONSES TO PHQ QUESTIONS 1-9: 0
2. FEELING DOWN, DEPRESSED OR HOPELESS: NOT AT ALL
1. LITTLE INTEREST OR PLEASURE IN DOING THINGS: NOT AT ALL
SUM OF ALL RESPONSES TO PHQ QUESTIONS 1-9: 0

## 2025-02-19 NOTE — PROGRESS NOTES
Chief Complaint   Patient presents with    Follow-up    Graves' Disease     [phone] Patient prefers a telephone call.      \"Have you been to the ER, urgent care clinic since your last visit?  Hospitalized since your last visit?\"    NO    “Have you seen or consulted any other health care providers outside our system since your last visit?”    NO     “Have you had a pap smear?”    NO    No cervical cancer screening on file     Bjorn Nassar RN

## 2025-02-19 NOTE — PROGRESS NOTES
RN called patient (name and  verified) and went over future appt info and medication changes. [employer] coupon texted to patient.  Patient confirmed understanding, no further questions at this time.

## 2025-02-19 NOTE — PROGRESS NOTES
Chief Complaint   Patient presents with    Follow-up    Graves' Disease     [phone] Patient prefers a telephone call.        **THIS IS A VIRTUAL VISIT VIA A TELEPHONE ENCOUNTER.  PATIENT AGREED TO HAVE THEIR CARE DELIVERED OVER THE PHONE IN PLACE OF THEIR REGULARLY SCHEDULED OFFICE VISIT**    History of Present Illness: Julisa Streeter is a 29 y.o. female here for follow up of thyroid.  Compliant with MMI 10 mg 1 tab 6 days a week and 1/2 tab on Sun and hasn't missed any doses.  No chest pain or palpitations.  No tremors.  No heat or cold intolerance.  Has had some mild hair loss.  Bowels are regular.          Current Outpatient Medications   Medication Sig    methIMAzole (TAPAZOLE) 10 MG tablet Take 1 tab on Mon-Sat and 1/2 tab on Sun     No current facility-administered medications for this visit.     No Known Allergies    Review of Systems: PER HPI    Physical Examination: NOT PERFORMED DUE TO THIS BEING A TELEPHONE CALL      Data Reviewed:   Component      Latest Ref Rng 2/6/2025   Thyrotropin Receptor Ab      0.00 - 1.75 IU/L <1.10    TSH, 3rd Generation      0.36 - 3.74 uIU/mL 0.01 (L)    T4 Free      0.8 - 1.5 NG/DL 1.4           Assessment/Plan:     1. Graves' disease: In July 2022 was diagnosed with Grave's disease when her TSH was < 0.01, FT4 > 8.0, FT3 >30 and TRAb of 114 and started on Methimazole 20 mg bid.  Had repeat labs on 10/6/22 that showed TSH < 0.01 and FT4 1.3 and dose was kept the same.  At initial visit with me on 11/2/22, I decreased her dose to 30 mg daily for the next 2 months and TSH 0.01, FT4 low at 0.5 and TRAb 25.8 in 12/22 so decreased to 20 mg daily and TSH < 0.01 and FT4 1.0 in 2/23 so kept dose the same.  TSH 0.01 and FT4 0.8 and TRAb 9.02 in 5/23 so kept dose the same and TSH 1.09 and FT4 0.9 in 8/23 so kept dose the same for 2 more months and TSH 2.65 in 10/23 so decreased to 15 mg daily and TSH 0.39 and TRAb 2.15 in 1/24 so kept dose the same.  TSH 1.67 and TRAb 1.25 in

## 2025-05-13 ENCOUNTER — TELEPHONE (OUTPATIENT)
Age: 30
End: 2025-05-13

## 2025-05-13 NOTE — TELEPHONE ENCOUNTER
Attempted to call patient x2 to schedule her for lab appt before her visit with Dr. Stein on 06/04. Unable to leave a voicemail, will try again at a later time.

## 2025-05-20 ENCOUNTER — HOSPITAL ENCOUNTER (OUTPATIENT)
Facility: HOSPITAL | Age: 30
Setting detail: SPECIMEN
Discharge: HOME | End: 2025-05-23

## 2025-05-20 ENCOUNTER — LAB (OUTPATIENT)
Age: 30
End: 2025-05-20

## 2025-05-20 DIAGNOSIS — E05.00 GRAVES' DISEASE: ICD-10-CM

## 2025-05-20 PROCEDURE — 84443 ASSAY THYROID STIM HORMONE: CPT

## 2025-05-20 PROCEDURE — 83520 IMMUNOASSAY QUANT NOS NONAB: CPT

## 2025-05-20 PROCEDURE — 84439 ASSAY OF FREE THYROXINE: CPT

## 2025-05-20 NOTE — PROGRESS NOTES
Julisa Davey [facility] presents for ordered labs, pt's name and  verified. Labs collected per provider's order: TSH, T4 Free, and Thyrotropin Receptor Antibody. Lab draw performed by GUERO Osorio without any complications. --Yadi Lopez RN

## 2025-05-21 LAB
T4 FREE SERPL-MCNC: 1 NG/DL (ref 0.8–1.5)
TSH SERPL DL<=0.05 MIU/L-ACNC: 1.45 UIU/ML (ref 0.36–3.74)

## 2025-05-22 LAB — TSH RECEP AB SER-ACNC: <1.1 IU/L (ref 0–1.75)

## 2025-06-04 ENCOUNTER — RESULTS FOLLOW-UP (OUTPATIENT)
Age: 30
End: 2025-06-04

## 2025-06-04 ENCOUNTER — OFFICE VISIT (OUTPATIENT)
Age: 30
End: 2025-06-04

## 2025-06-04 VITALS
WEIGHT: 100.2 LBS | OXYGEN SATURATION: 97 % | SYSTOLIC BLOOD PRESSURE: 103 MMHG | HEART RATE: 77 BPM | BODY MASS INDEX: 21.47 KG/M2 | TEMPERATURE: 99 F | DIASTOLIC BLOOD PRESSURE: 62 MMHG

## 2025-06-04 DIAGNOSIS — E05.00 GRAVES' DISEASE: Primary | ICD-10-CM

## 2025-06-04 PROCEDURE — 99214 OFFICE O/P EST MOD 30 MIN: CPT | Performed by: INTERNAL MEDICINE

## 2025-06-04 RX ORDER — METHIMAZOLE 10 MG/1
TABLET ORAL
Qty: 120 TABLET | Refills: 3 | Status: SHIPPED | OUTPATIENT
Start: 2025-06-04

## 2025-06-04 SDOH — SOCIAL STABILITY: SOCIAL INSECURITY: WITHIN THE LAST YEAR, HAVE YOU BEEN HUMILIATED OR EMOTIONALLY ABUSED IN OTHER WAYS BY YOUR PARTNER OR EX-PARTNER?: NO

## 2025-06-04 SDOH — SOCIAL STABILITY: SOCIAL INSECURITY: WITHIN THE LAST YEAR, HAVE YOU BEEN AFRAID OF YOUR PARTNER OR EX-PARTNER?: NO

## 2025-06-04 SDOH — SOCIAL STABILITY: SOCIAL NETWORK: HOW OFTEN DO YOU ATTEND MEETINGS OF THE CLUBS OR ORGANIZATIONS YOU BELONG TO?: NEVER

## 2025-06-04 SDOH — HEALTH STABILITY: PHYSICAL HEALTH: ON AVERAGE, HOW MANY MINUTES DO YOU ENGAGE IN EXERCISE AT THIS LEVEL?: 0 MIN

## 2025-06-04 SDOH — HEALTH STABILITY: MENTAL HEALTH: HOW OFTEN DO YOU HAVE A DRINK CONTAINING ALCOHOL?: NEVER

## 2025-06-04 SDOH — SOCIAL STABILITY: SOCIAL INSECURITY
WITHIN THE LAST YEAR, HAVE YOU BEEN RAPED OR FORCED TO HAVE ANY KIND OF SEXUAL ACTIVITY BY YOUR PARTNER OR EX-PARTNER?: NO

## 2025-06-04 SDOH — SOCIAL STABILITY: SOCIAL INSECURITY
WITHIN THE LAST YEAR, HAVE YOU BEEN KICKED, HIT, SLAPPED, OR OTHERWISE PHYSICALLY HURT BY YOUR PARTNER OR EX-PARTNER?: NO

## 2025-06-04 SDOH — ECONOMIC STABILITY: FOOD INSECURITY: HOW HARD IS IT FOR YOU TO PAY FOR THE VERY BASICS LIKE FOOD, HOUSING, MEDICAL CARE, AND HEATING?: NOT HARD AT ALL

## 2025-06-04 SDOH — ECONOMIC STABILITY: FOOD INSECURITY: WITHIN THE PAST 12 MONTHS, YOU WORRIED THAT YOUR FOOD WOULD RUN OUT BEFORE YOU GOT THE MONEY TO BUY MORE.: NEVER TRUE

## 2025-06-04 SDOH — SOCIAL STABILITY: SOCIAL NETWORK
DO YOU BELONG TO ANY CLUBS OR ORGANIZATIONS SUCH AS CHURCH GROUPS, UNIONS, FRATERNAL OR ATHLETIC GROUPS, OR SCHOOL GROUPS?: NO

## 2025-06-04 SDOH — SOCIAL STABILITY: SOCIAL NETWORK: IN A TYPICAL WEEK, HOW MANY TIMES DO YOU TALK ON THE PHONE WITH FAMILY, FRIENDS, OR NEIGHBORS?: THREE TIMES A WEEK

## 2025-06-04 SDOH — HEALTH STABILITY: MENTAL HEALTH
DO YOU FEEL STRESS - TENSE, RESTLESS, NERVOUS, OR ANXIOUS, OR UNABLE TO SLEEP AT NIGHT BECAUSE YOUR MIND IS TROUBLED ALL THE TIME - THESE DAYS?: NOT AT ALL

## 2025-06-04 SDOH — HEALTH STABILITY: MENTAL HEALTH: HOW MANY DRINKS CONTAINING ALCOHOL DO YOU HAVE ON A TYPICAL DAY WHEN YOU ARE DRINKING?: PATIENT DOES NOT DRINK

## 2025-06-04 SDOH — SOCIAL STABILITY: SOCIAL NETWORK: HOW OFTEN DO YOU ATTEND CHURCH OR RELIGIOUS SERVICES?: 1 TO 4 TIMES PER YEAR

## 2025-06-04 SDOH — SOCIAL STABILITY: SOCIAL INSECURITY: ARE YOU MARRIED, WIDOWED, DIVORCED, SEPARATED, NEVER MARRIED, OR LIVING WITH A PARTNER?: NEVER MARRIED

## 2025-06-04 SDOH — HEALTH STABILITY: PHYSICAL HEALTH: ON AVERAGE, HOW MANY DAYS PER WEEK DO YOU ENGAGE IN MODERATE TO STRENUOUS EXERCISE (LIKE A BRISK WALK)?: 0 DAYS

## 2025-06-04 SDOH — ECONOMIC STABILITY: FOOD INSECURITY: WITHIN THE PAST 12 MONTHS, THE FOOD YOU BOUGHT JUST DIDN'T LAST AND YOU DIDN'T HAVE MONEY TO GET MORE.: NEVER TRUE

## 2025-06-04 SDOH — ECONOMIC STABILITY: HOUSING INSECURITY: IN THE LAST 12 MONTHS, WAS THERE A TIME WHEN YOU WERE NOT ABLE TO PAY THE MORTGAGE OR RENT ON TIME?: NO

## 2025-06-04 SDOH — SOCIAL STABILITY: SOCIAL NETWORK: HOW OFTEN DO YOU GET TOGETHER WITH FRIENDS OR RELATIVES?: THREE TIMES A WEEK

## 2025-06-04 SDOH — ECONOMIC STABILITY: TRANSPORTATION INSECURITY: IN THE PAST 12 MONTHS, HAS LACK OF TRANSPORTATION KEPT YOU FROM MEDICAL APPOINTMENTS OR FROM GETTING MEDICATIONS?: NO

## 2025-06-04 ASSESSMENT — PATIENT HEALTH QUESTIONNAIRE - PHQ9
1. LITTLE INTEREST OR PLEASURE IN DOING THINGS: NOT AT ALL
SUM OF ALL RESPONSES TO PHQ QUESTIONS 1-9: 0
SUM OF ALL RESPONSES TO PHQ QUESTIONS 1-9: 0
2. FEELING DOWN, DEPRESSED OR HOPELESS: NOT AT ALL
SUM OF ALL RESPONSES TO PHQ QUESTIONS 1-9: 0
SUM OF ALL RESPONSES TO PHQ QUESTIONS 1-9: 0

## 2025-06-04 ASSESSMENT — ACTIVITIES OF DAILY LIVING (ADL): LACK_OF_TRANSPORTATION: NO

## 2025-06-04 NOTE — PROGRESS NOTES
Have you been to the ER, urgent care clinic since your last visit?  Hospitalized since your last visit?   NO    Have you seen or consulted any other health care providers outside our system since your last visit?   NO     “Have you had a pap smear?”    NO    No cervical cancer screening on file

## 2025-06-04 NOTE — PROGRESS NOTES
Chief Complaint   Patient presents with    Follow-up     Thyroid f/u      History of Present Illness: Julisa Davey [facility] is a 30 y.o. female here for follow up of thyroid.  Weight up 3 lbs since last visit in person in 11/24.      History of Present Illness  The patient is a 30-year-old female here for follow-up of hyperthyroidism.    She has been compliant with her methimazole, taking 10 mg one tab Monday through Friday and 2 tabs on Saturday and Sunday. Overall energy has been good. She has noticed very rare chest pain and heart racing, palpitations about once a week. Her bowels are regular. No hair loss or nails breaking. Temperature feels good. Her weight is up 3 pounds since her last visit in person in November 2024.      Current Outpatient Medications   Medication Sig    methIMAzole (TAPAZOLE) 10 MG tablet Take 1 tab on Mon-Fri and 2 tabs on Sat-Sun     No current facility-administered medications for this visit.     No Known Allergies    Review of Systems: PER HPI    Physical Examination:  Blood pressure 103/62, pulse 77, temperature 99 °F (37.2 °C), temperature source Temporal, weight 45.5 kg (100 lb 3.2 oz), last menstrual period 05/13/2025, SpO2 97%.  General: pleasant, no distress, good eye contact   Neck: small goiter  Cardiovascular: regular, normal rate, nl s1 and s2, no m/r/g   Respiratory: clear to auscultation bilaterally   Integumentary: skin is normal, no edema  Neurological: reflexes 2+ at biceps, no tremors  Psychiatric: normal mood and affect    Data Reviewed:   Component      Latest Ref Rng 5/20/2025   T4 Free      0.8 - 1.5 NG/DL 1.0    TSH, 3rd Generation      0.36 - 3.74 uIU/mL 1.45    Thyrotropin Receptor Ab      0.00 - 1.75 IU/L <1.10          Assessment/Plan:     1. Graves' disease: In July 2022 was diagnosed with Grave's disease when her TSH was < 0.01, FT4 > 8.0, FT3 >30 and TRAb of 114 and started on Methimazole 20 mg bid.  Had repeat labs on 10/6/22 that showed TSH < 0.01 and FT4 1.3

## 2025-06-04 NOTE — PROGRESS NOTES
Julisa Davey Ferdinand seen at d/c, full name and  verified, given After visit Summary and reviewed today's visit with patient along with instructions on when it is recommended to come back (Return 9/3/25 at 2:40pm. )  Appt for PCP wellness visit scheduled  (Dr Stein's labs to be completed during this visit) and Dr Stein follow up scheduled for patient.

## 2025-07-29 ENCOUNTER — TELEPHONE (OUTPATIENT)
Age: 30
End: 2025-07-29

## 2025-07-29 NOTE — TELEPHONE ENCOUNTER
Attempted to call patient x2 on both numbers, voicemail left with [facility]AN callback number.    Patient needs a lab appt before 09/03 appt with Dr. Stein.

## 2025-08-14 ENCOUNTER — HOSPITAL ENCOUNTER (OUTPATIENT)
Facility: HOSPITAL | Age: 30
Setting detail: SPECIMEN
Discharge: HOME | End: 2025-08-17

## 2025-08-14 ENCOUNTER — OFFICE VISIT (OUTPATIENT)
Age: 30
End: 2025-08-14

## 2025-08-14 VITALS
HEART RATE: 64 BPM | DIASTOLIC BLOOD PRESSURE: 68 MMHG | SYSTOLIC BLOOD PRESSURE: 108 MMHG | TEMPERATURE: 98.2 F | WEIGHT: 99.8 LBS | BODY MASS INDEX: 21.38 KG/M2

## 2025-08-14 DIAGNOSIS — H93.13 TINNITUS OF BOTH EARS: ICD-10-CM

## 2025-08-14 DIAGNOSIS — Z00.00 ENCOUNTER FOR WELLNESS EXAMINATION IN ADULT: ICD-10-CM

## 2025-08-14 DIAGNOSIS — E05.00 GRAVES' DISEASE: ICD-10-CM

## 2025-08-14 DIAGNOSIS — Z00.00 ENCOUNTER FOR WELLNESS EXAMINATION IN ADULT: Primary | ICD-10-CM

## 2025-08-14 LAB
ERYTHROCYTE [DISTWIDTH] IN BLOOD BY AUTOMATED COUNT: 15.8 % (ref 11.5–14.5)
HCT VFR BLD AUTO: 36.4 % (ref 35–47)
HGB BLD-MCNC: 11.2 G/DL (ref 11.5–16)
MCH RBC QN AUTO: 24.8 PG (ref 26–34)
MCHC RBC AUTO-ENTMCNC: 30.8 G/DL (ref 30–36.5)
MCV RBC AUTO: 80.5 FL (ref 80–99)
NRBC # BLD: 0 K/UL (ref 0–0.01)
NRBC BLD-RTO: 0 PER 100 WBC
PLATELET # BLD AUTO: 285 K/UL (ref 150–400)
PMV BLD AUTO: 11.6 FL (ref 8.9–12.9)
RBC # BLD AUTO: 4.52 M/UL (ref 3.8–5.2)
WBC # BLD AUTO: 5.8 K/UL (ref 3.6–11)

## 2025-08-14 PROCEDURE — 83520 IMMUNOASSAY QUANT NOS NONAB: CPT

## 2025-08-14 PROCEDURE — 82607 VITAMIN B-12: CPT

## 2025-08-14 PROCEDURE — 99395 PREV VISIT EST AGE 18-39: CPT | Performed by: FAMILY MEDICINE

## 2025-08-14 PROCEDURE — 84439 ASSAY OF FREE THYROXINE: CPT

## 2025-08-14 PROCEDURE — 80053 COMPREHEN METABOLIC PANEL: CPT

## 2025-08-14 PROCEDURE — 84443 ASSAY THYROID STIM HORMONE: CPT

## 2025-08-14 PROCEDURE — 85027 COMPLETE CBC AUTOMATED: CPT

## 2025-08-14 RX ORDER — CETIRIZINE HYDROCHLORIDE 10 MG/1
10 TABLET ORAL
Qty: 30 TABLET | Refills: 1 | Status: SHIPPED | OUTPATIENT
Start: 2025-08-14

## 2025-08-14 ASSESSMENT — LIFESTYLE VARIABLES: HOW OFTEN DO YOU HAVE A DRINK CONTAINING ALCOHOL: NEVER

## 2025-08-14 ASSESSMENT — PATIENT HEALTH QUESTIONNAIRE - PHQ9
2. FEELING DOWN, DEPRESSED OR HOPELESS: NOT AT ALL
SUM OF ALL RESPONSES TO PHQ QUESTIONS 1-9: 0
SUM OF ALL RESPONSES TO PHQ QUESTIONS 1-9: 0
1. LITTLE INTEREST OR PLEASURE IN DOING THINGS: NOT AT ALL
SUM OF ALL RESPONSES TO PHQ QUESTIONS 1-9: 0
SUM OF ALL RESPONSES TO PHQ QUESTIONS 1-9: 0

## 2025-08-14 ASSESSMENT — ENCOUNTER SYMPTOMS
CHEST TIGHTNESS: 0
SHORTNESS OF BREATH: 0
COUGH: 0

## 2025-08-15 LAB
ALBUMIN SERPL-MCNC: 3.6 G/DL (ref 3.5–5.2)
ALBUMIN/GLOB SERPL: 1.1 (ref 1.1–2.2)
ALP SERPL-CCNC: 68 U/L (ref 35–104)
ALT SERPL-CCNC: 13 U/L (ref 10–35)
ANION GAP SERPL CALC-SCNC: 9 MMOL/L (ref 2–14)
AST SERPL-CCNC: 15 U/L (ref 10–35)
BILIRUB SERPL-MCNC: 0.2 MG/DL (ref 0–1.2)
BUN SERPL-MCNC: 7 MG/DL (ref 6–20)
BUN/CREAT SERPL: 13 (ref 12–20)
CALCIUM SERPL-MCNC: 8.9 MG/DL (ref 8.6–10)
CHLORIDE SERPL-SCNC: 103 MMOL/L (ref 98–107)
CO2 SERPL-SCNC: 26 MMOL/L (ref 20–29)
CREAT SERPL-MCNC: 0.58 MG/DL (ref 0.6–1)
GLOBULIN SER CALC-MCNC: 3.3 G/DL (ref 2–4)
GLUCOSE SERPL-MCNC: 85 MG/DL (ref 65–100)
POTASSIUM SERPL-SCNC: 4.3 MMOL/L (ref 3.5–5.1)
PROT SERPL-MCNC: 6.9 G/DL (ref 6.4–8.3)
SODIUM SERPL-SCNC: 138 MMOL/L (ref 136–145)
VIT B12 SERPL-MCNC: 645 PG/ML (ref 232–1245)

## 2025-08-18 ENCOUNTER — TELEPHONE (OUTPATIENT)
Age: 30
End: 2025-08-18

## 2025-08-18 LAB
T4 FREE SERPL-MCNC: 1 NG/DL (ref 0.9–1.6)
TSH, 3RD GENERATION: 1.12 UIU/ML (ref 0.27–4.2)

## 2025-08-19 LAB — TSH RECEP AB SER-ACNC: <1.1 IU/L (ref 0–1.75)

## 2025-09-03 ENCOUNTER — TELEPHONE (OUTPATIENT)
Age: 30
End: 2025-09-03

## 2025-09-03 DIAGNOSIS — Z71.89 COUNSELING AND COORDINATION OF CARE: Primary | ICD-10-CM

## 2025-09-05 ENCOUNTER — TELEPHONE (OUTPATIENT)
Age: 30
End: 2025-09-05